# Patient Record
Sex: MALE | Race: WHITE | NOT HISPANIC OR LATINO | Employment: OTHER | URBAN - METROPOLITAN AREA
[De-identification: names, ages, dates, MRNs, and addresses within clinical notes are randomized per-mention and may not be internally consistent; named-entity substitution may affect disease eponyms.]

---

## 2017-01-19 ENCOUNTER — CARE COORDINATION (OUTPATIENT)
Dept: UROLOGY | Facility: CLINIC | Age: 82
End: 2017-01-19

## 2017-01-19 NOTE — PROGRESS NOTES
Left 3 messages to see if pt had his H &P done    Eleni Prescott, RN   Care Coordinator Urology

## 2017-01-24 ENCOUNTER — ANESTHESIA (OUTPATIENT)
Dept: SURGERY | Facility: AMBULATORY SURGERY CENTER | Age: 82
End: 2017-01-24

## 2017-01-24 RX ORDER — GLYCOPYRROLATE 0.2 MG/ML
INJECTION, SOLUTION INTRAMUSCULAR; INTRAVENOUS PRN
Status: DISCONTINUED | OUTPATIENT
Start: 2017-01-24 | End: 2017-01-24

## 2017-01-24 RX ORDER — PROPOFOL 10 MG/ML
INJECTION, EMULSION INTRAVENOUS CONTINUOUS PRN
Status: DISCONTINUED | OUTPATIENT
Start: 2017-01-24 | End: 2017-01-24

## 2017-01-24 RX ORDER — PROPOFOL 10 MG/ML
INJECTION, EMULSION INTRAVENOUS PRN
Status: DISCONTINUED | OUTPATIENT
Start: 2017-01-24 | End: 2017-01-24

## 2017-01-24 RX ORDER — LIDOCAINE HYDROCHLORIDE 20 MG/ML
INJECTION, SOLUTION INFILTRATION; PERINEURAL PRN
Status: DISCONTINUED | OUTPATIENT
Start: 2017-01-24 | End: 2017-01-24

## 2017-01-24 RX ORDER — EPHEDRINE SULFATE 50 MG/ML
INJECTION, SOLUTION INTRAMUSCULAR; INTRAVENOUS; SUBCUTANEOUS PRN
Status: DISCONTINUED | OUTPATIENT
Start: 2017-01-24 | End: 2017-01-24

## 2017-01-24 RX ORDER — DEXAMETHASONE SODIUM PHOSPHATE 4 MG/ML
INJECTION, SOLUTION INTRA-ARTICULAR; INTRALESIONAL; INTRAMUSCULAR; INTRAVENOUS; SOFT TISSUE PRN
Status: DISCONTINUED | OUTPATIENT
Start: 2017-01-24 | End: 2017-01-24

## 2017-01-24 RX ORDER — ONDANSETRON 2 MG/ML
INJECTION INTRAMUSCULAR; INTRAVENOUS PRN
Status: DISCONTINUED | OUTPATIENT
Start: 2017-01-24 | End: 2017-01-24

## 2017-01-24 RX ORDER — FENTANYL CITRATE 50 UG/ML
INJECTION, SOLUTION INTRAMUSCULAR; INTRAVENOUS PRN
Status: DISCONTINUED | OUTPATIENT
Start: 2017-01-24 | End: 2017-01-24

## 2017-01-24 RX ADMIN — EPHEDRINE SULFATE 10 MG: 50 INJECTION, SOLUTION INTRAMUSCULAR; INTRAVENOUS; SUBCUTANEOUS at 08:20

## 2017-01-24 RX ADMIN — GLYCOPYRROLATE 0.2 MG: 0.2 INJECTION, SOLUTION INTRAMUSCULAR; INTRAVENOUS at 08:45

## 2017-01-24 RX ADMIN — PROPOFOL 50 MCG/KG/MIN: 10 INJECTION, EMULSION INTRAVENOUS at 11:10

## 2017-01-24 RX ADMIN — FENTANYL CITRATE 50 MCG: 50 INJECTION, SOLUTION INTRAMUSCULAR; INTRAVENOUS at 08:06

## 2017-01-24 RX ADMIN — ONDANSETRON 4 MG: 2 INJECTION INTRAMUSCULAR; INTRAVENOUS at 11:05

## 2017-01-24 RX ADMIN — PROPOFOL: 10 INJECTION, EMULSION INTRAVENOUS at 10:23

## 2017-01-24 RX ADMIN — PROPOFOL: 10 INJECTION, EMULSION INTRAVENOUS at 09:39

## 2017-01-24 RX ADMIN — LIDOCAINE HYDROCHLORIDE 100 MG: 20 INJECTION, SOLUTION INFILTRATION; PERINEURAL at 08:06

## 2017-01-24 RX ADMIN — DEXAMETHASONE SODIUM PHOSPHATE 4 MG: 4 INJECTION, SOLUTION INTRA-ARTICULAR; INTRALESIONAL; INTRAMUSCULAR; INTRAVENOUS; SOFT TISSUE at 08:15

## 2017-01-24 RX ADMIN — FENTANYL CITRATE 25 MCG: 50 INJECTION, SOLUTION INTRAMUSCULAR; INTRAVENOUS at 09:35

## 2017-01-24 RX ADMIN — PROPOFOL 150 MCG/KG/MIN: 10 INJECTION, EMULSION INTRAVENOUS at 08:09

## 2017-01-24 RX ADMIN — ONDANSETRON 4 MG: 2 INJECTION INTRAMUSCULAR; INTRAVENOUS at 08:15

## 2017-01-24 RX ADMIN — PROPOFOL 150 MG: 10 INJECTION, EMULSION INTRAVENOUS at 08:06

## 2017-01-24 RX ADMIN — FENTANYL CITRATE 25 MCG: 50 INJECTION, SOLUTION INTRAMUSCULAR; INTRAVENOUS at 10:40

## 2017-01-24 RX ADMIN — EPHEDRINE SULFATE 5 MG: 50 INJECTION, SOLUTION INTRAMUSCULAR; INTRAVENOUS; SUBCUTANEOUS at 08:53

## 2017-01-25 DIAGNOSIS — Z86.79 S/P EXCISION OF VARICOCELE: Primary | ICD-10-CM

## 2017-01-25 DIAGNOSIS — Z98.890 S/P EXCISION OF VARICOCELE: Primary | ICD-10-CM

## 2017-01-27 ENCOUNTER — CARE COORDINATION (OUTPATIENT)
Dept: UROLOGY | Facility: CLINIC | Age: 82
End: 2017-01-27

## 2017-01-27 NOTE — PROGRESS NOTES
Left message to call to let us know how he us know how he is doing since the surgery on Tuesday    Eleni Prescott, RN   Care Coordinator Urology

## 2017-02-03 ENCOUNTER — PRE VISIT (OUTPATIENT)
Dept: UROLOGY | Facility: CLINIC | Age: 82
End: 2017-02-03

## 2017-02-03 NOTE — TELEPHONE ENCOUNTER
Bilateral varicocelectomy surgical follow up for scrotal pain. Records in epic. No labs or imaging needed. Records in [facility]. No need to call patient

## 2017-02-09 ENCOUNTER — OFFICE VISIT (OUTPATIENT)
Dept: UROLOGY | Facility: CLINIC | Age: 82
End: 2017-02-09

## 2017-02-09 VITALS
HEART RATE: 57 BPM | SYSTOLIC BLOOD PRESSURE: 171 MMHG | BODY MASS INDEX: 27.91 KG/M2 | DIASTOLIC BLOOD PRESSURE: 88 MMHG | WEIGHT: 200 LBS

## 2017-02-09 DIAGNOSIS — C61 PROSTATE CANCER (H): Primary | ICD-10-CM

## 2017-02-09 DIAGNOSIS — I86.1 LEFT VARICOCELE: ICD-10-CM

## 2017-02-09 ASSESSMENT — PAIN SCALES - GENERAL: PAINLEVEL: NO PAIN (0)

## 2017-02-09 NOTE — NURSING NOTE
Chief Complaint   Patient presents with     Surgical Followup     Bilateral varicocelectomy      Endy SHAH

## 2017-02-09 NOTE — MR AVS SNAPSHOT
After Visit Summary   2/9/2017    Panchito Olivera    MRN: [id number]           Patient Information     Date Of Birth          1935        Visit Information        Provider Department      2/9/2017 2:00 PM Diego Randle MD [facility] and [facility] for Prostate and Urologic Cancers        Today's Diagnoses     Prostate cancer (H)    -  1     Left varicocele           Care Instructions    Follow up with Dr Sunshine concerning your PSA on 4/13/17 at 3 pm. Please complete PSA prior to visit    It was a pleasure meeting with you today.  Thank you for allowing me and my team the privilege of caring for you today.  YOU are the reason we are here, and I truly hope we provided you with the excellent service you deserve.  Please let us know if there is anything else we can do for you so that we can be sure you are leaving completely satisfied with your care experience.                Follow-ups after your visit        Additional Services     IR REFERRAL       [facility] IR REFERRAL  Call [phone] to schedule.    IR General Referral    Procedure requested: consult about left varicocele embolization, possible trans-scrotal venography to look for persistent left varicocele after microscopic varicocele repair.  Associated Diagnosis: left varicocele   Date preferred: April 2017.       The Interventional Radiology 1C will consult patients for these procedures:  Lung biopsy, kidney biopsy, liver biopsy, line placement, port placement.  If the procedure requested is not in the list above, please place this referral and call [phone].    Please be aware that coverage of these services is subject to the terms and limitations of your health insurance plan.  Call member services at your health plan with any benefit or coverage questions.       Please bring the following to your appointment:  >>   Any x-rays, CTs or MRIs which have been performed.  Contact the facility where they were done to arrange for pick  up prior to your scheduled appointment.  Any new CT, MRI or other procedures ordered by your specialist must be performed at a [facility] facility or coordinated by your clinic's referral office.    >>   List of current medications  >>   This referral request   >>   Any documents/labs given to you for this referral                  Your next 10 appointments already scheduled     Apr 13, 2017  3:00 PM   (Arrive by 2:45 PM)   Return Prostate Cancer with Endy Sunshine MD   [facility] and [facility] for Prostate and Urologic Cancers ([facility])    [address] [place] [address]  [address] [facility] [address] [phone]              Future tests that were ordered for you today     Open Future Orders        Priority Expected Expires Ordered    PSA tumor marker Routine  5/9/2017 2/9/2017    Testosterone total Routine  5/9/2017 2/9/2017            Who to contact     Please call your clinic at [phone] to:    Ask questions about your health    Make or cancel appointments    Discuss your medicines    Learn about your test results    Speak to your doctor   If you have compliments or concerns about an experience at your clinic, or if you wish to file a complaint, please contact [facility] Patient Relations at [phone] or email us at [email or web address]         Additional Information About Your Visit        [email or web address] Information     App[employer]ially is an electronic gateway that provides easy, online access to your medical records. With App[employer]ially, you can request a clinic appointment, read your test results, renew a prescription or communicate with your care team.     To sign up for [employer] visit the website at [email or web address]   You will be asked to enter the access code listed below, as well as some personal information. Please follow the directions to create your username and password.     Your access code is: [email or web address]  Expires: 4/24/2017  12:23 PM     Your access code will  in 90 days. If you need help or a new code, please contact your [facility] or call [phone] for assistance.        Care EveryWhere ID     This is your Care EveryWhere ID. This could be used by other organizations to access your [facility] medical records  [id number]        Your Vitals Were     Pulse                   57            Blood Pressure from Last 3 Encounters:   17 171/88   17 142/90   16 178/99    Weight from Last 3 Encounters:   17 90.719 kg (200 lb)   17 90.719 kg (200 lb)   16 92.08 kg (203 lb)              We Performed the Following     IR REFERRAL        Primary Care Provider Office Phone # Fax #    Santiago Knight [phone]       XXX RESIGNED XXX [address]  [facility] [address]        Thank you!     Thank you for choosing [facility] UROLOGY AND [facility] FOR PROSTATE AND UROLOGIC CANCERS  for your care. Our goal is always to provide you with excellent care. Hearing back from our patients is one way we can continue to improve our services. Please take a few minutes to complete the written survey that you may receive in the mail after your visit with us. Thank you!             Your Updated Medication List - Protect others around you: Learn how to safely use, store and throw away your medicines at www.disposemymeds.org.          This list is accurate as of: 17  2:36 PM.  Always use your most recent med list.                   Brand Name Dispense Instructions for use    aspirin 81 MG tablet     100 tablet    Take 1 tablet by mouth daily.       * blood glucose monitoring test strip    no brand specified    3 Box    by In Vitro route daily.       * blood glucose monitoring test strip    no brand specified    3 Box    Check blood sugar twice daily       CARVEDILOL PO      Take 12.5 mg by mouth 2 times daily (with meals)       Coenzyme Q10 300 MG Caps      Take 300 mg by mouth  every morning       hydrochlorothiazide 12.5 MG capsule    MICROZIDE     Take 12.5 mg by mouth every morning       insulin aspart protamine-insulin aspart injection    NovoLOG MIX 70/30 FLEXpen    3 Month    Inject 5 -7 units under the skin twice daily before meals.       insulin pen needle 29G X 12.7MM    BD ULTRA-FINE    100 each    Use 2 daily or as directed.       levothyroxine 150 MCG tablet    LEVOXYL    90 tablet    Take 1 tablet by mouth daily.       LOSARTAN POTASSIUM PO      Take 100 mg by mouth daily       nitroglycerin 0.4 MG sublingual tablet    NITROSTAT    25 tablet    Place 1 tablet under the tongue every 5 minutes as needed for chest pain.       oxyCODONE-acetaminophen 5-325 MG per tablet    PERCOCET    25 tablet    Take 1 tablet by mouth every 4 hours as needed for pain       tadalafil 20 MG tablet    CIALIS    30 tablet    Take 1 tablet by mouth. TAKEN AT LEAST 30 MINUTES BEFORE INTERCOURSE       vitamin  B-12 250 MCG Tabs      Take 250 mcg by mouth every morning       * Notice:  This list has 2 medication(s) that are the same as other medications prescribed for you. Read the directions carefully, and ask your doctor or other care provider to review them with you.

## 2017-02-09 NOTE — LETTER
2/9/2017       RE: Panchito Olivera  [address] [place] [address]     Dear Colleague,    Thank you for referring your patient, Panchito Olivera, to the [facility] UROLOGY AND [facility] FOR PROSTATE AND UROLOGIC CANCERS at [facility]. Please see a copy of my visit note below.    CC: Panchito Olivera is post-op from bilateral varicocele repair   HPI: Patient is 2 wks post op.  he has been doing well. No fevers or chills. Pain decreasing.   He states the left scrotal pain was gone, now notes a little twinge of pain on the left side again.  Scrotal ultrasound done today suggests a persistent left varicocele.    Exam today /88 mmHg  Pulse 57  Wt 90.719 kg (200 lb)  Alert, oriented, NAD.  Incisions are healing well. No discharge, erythema or fluctuence suggestive of infection.   No clinical varicocele by my exam today.  No engorgement with valsalva.    PLAN: future T and FSH levels.  He wants to see IR to get another opinion to see if left varicocele needs embolization, also to help with the left scrotal pain.     I told him varicocele repair should not have improved his prostate cancer.    I did advise he also follow-up with one of my partners that manages prostate cancer.  He would like to do that now.      Cal RECIO     Visit within post-op global.

## 2017-02-12 NOTE — PROGRESS NOTES
CC: Panchito Olivera is post-op from bilateral varicocele repair   HPI: Patient is 2 wks post op.  he has been doing well. No fevers or chills. Pain decreasing.   He states the left scrotal pain was gone, now notes a little twinge of pain on the left side again.  Scrotal ultrasound done today suggests a persistent left varicocele.    Exam today /88 mmHg  Pulse 57  Wt 90.719 kg (200 lb)  Alert, oriented, NAD.  Incisions are healing well. No discharge, erythema or fluctuence suggestive of infection.   No clinical varicocele by my exam today.  No engorgement with valsalva.    PLAN: future T and FSH levels.  He wants to see IR to get another opinion to see if left varicocele needs embolization, also to help with the left scrotal pain.     I told him varicocele repair should not have improved his prostate cancer.    I did advise he also follow-up with one of my partners that manages prostate cancer.  He would like to do that now.      Cal RECIO     Visit within post-op global.

## 2017-04-26 ENCOUNTER — TELEPHONE (OUTPATIENT)
Dept: UROLOGY | Facility: CLINIC | Age: 82
End: 2017-04-26

## 2017-04-26 NOTE — TELEPHONE ENCOUNTER
[facility] Call Center    Phone Message    Name of Caller: Panchito    Phone Number: Cell number on file:    Telephone Information:   Mobile [phone]       Best time to return call: any    May a detailed message be left on voicemail: yes    Relation to patient: Self    Reason for Call: Other: patient is calling to follow up on Dr. Randle reccomendations for seeing IR and a prostate cancer doctor. Please advise.     Action Taken: Message routed to:  Adult Clinics: Urology p [phone]

## 2017-04-26 NOTE — TELEPHONE ENCOUNTER
Called pt.    Discussed his varicocele situation.    On last exam, I did not think he had a clinical varicocele.  He is status-post bilateral varicocele repair Jan 2017.    His last scrotal ultrasound suggests dilated veins still present and maybe reversal of flow with valsalva.    He would like to see IR to discuss if embolization is appropriate.  He has the number and will call, declines offer for my office to call for him.    He understands PSA is rising and will seek follow-up for that with prostate cancer expert.    Cal RECIO

## 2017-04-26 NOTE — TELEPHONE ENCOUNTER
"Discussed patient question with Dr. Randle who recommends for patient to schedule to see Dr. Sunshine or Dr. Cordero at the [facility].     Called and spoke to patient who stated, \"I was supposed to get a call from another department to check on things further but I never did.\" Informed patient of the IR telephone number of [phone] and patient will call to schedule consult with IR. Patient aware of recommendation to schedule to see Dr. Sunshine or Dr. Cordero who specialize in prostate cancer care. [facility] telephone number provided to patient.    Patient stated, \"I am frustrated because before my surgery the ultrasound showed one thing and the ultrasound after surgery showed something different. I just don't understand why I can't talk to Dr. Randle. I paid money to see him and have him care for me and now I just feel like I am being pawned off. Why do I need to see another doctor and pay another $250 to see them.\" Reminded patient that it was recommended for him to see Dr. Susnhine or Dr. Cordero as they specialize in prostate cancer. Patient stated, \"I would really appreciate if I could just talk to Dr. Randle.\" Informed patient that a message would be sent to Dr. Randle with request for him to contact patient. Patient verbalized understanding and was comfortable with plan.     Nancy Song  General Surgery - Urology RN        "

## 2017-07-08 ENCOUNTER — HEALTH MAINTENANCE LETTER (OUTPATIENT)
Age: 82
End: 2017-07-08

## 2018-05-08 ENCOUNTER — ANESTHESIA - HEALTHEAST (OUTPATIENT)
Dept: SURGERY | Facility: HOSPITAL | Age: 83
End: 2018-05-08

## 2018-05-08 ASSESSMENT — MIFFLIN-ST. JEOR: SCORE: 1587.57

## 2018-05-09 ENCOUNTER — SURGERY - HEALTHEAST (OUTPATIENT)
Dept: SURGERY | Facility: HOSPITAL | Age: 83
End: 2018-05-09

## 2019-05-14 NOTE — PATIENT INSTRUCTIONS
Follow up with Dr Sunshine concerning your PSA on 4/13/17 at 3 pm. Please complete PSA prior to visit    It was a pleasure meeting with you today.  Thank you for allowing me and my team the privilege of caring for you today.  YOU are the reason we are here, and I truly hope we provided you with the excellent service you deserve.  Please let us know if there is anything else we can do for you so that we can be sure you are leaving completely satisfied with your care experience.           take motrin 600 mg every 6 hours as needed

## 2021-06-01 VITALS — HEIGHT: 69 IN | BODY MASS INDEX: 29.62 KG/M2 | WEIGHT: 200 LBS

## 2021-06-17 NOTE — ANESTHESIA PREPROCEDURE EVALUATION
Anesthesia Evaluation      Patient summary reviewed   No history of anesthetic complications     Airway   Mallampati: II  Neck ROM: full   Pulmonary - negative ROS and normal exam                          Cardiovascular - normal exam  (+) hypertension, ,     ECG reviewed        Neuro/Psych - negative ROS     Endo/Other    (+) diabetes mellitus type 2 well controlled, hypothyroidism, arthritis,      GI/Hepatic/Renal    (+)   chronic renal disease (CKD Stage III) CRI,           Dental - normal exam                        Anesthesia Plan  Planned anesthetic: general endotracheal  - D50 25mL bolus, per patient BG 75 is low for him (has symptoms at BG 85)  - Will recheck BS in OR  - ketamine 50mg  ASA 2   Induction: intravenous   Anesthetic plan and risks discussed with: patient    Post-op plan: routine recovery

## 2021-06-17 NOTE — ANESTHESIA POSTPROCEDURE EVALUATION
Patient: Panchito Olivera  3 PIECE PENILE PROSTHESIS  Anesthesia type: general    Patient location: PACU  Last vitals:   Vitals:    05/09/18 1300   BP: 167/77   Pulse: 61   Resp:    Temp:    SpO2: 95%     Post vital signs: stable  Level of consciousness: awake and responds to simple questions  Post-anesthesia pain: pain controlled  Post-anesthesia nausea and vomiting: no  Pulmonary: unassisted  Cardiovascular: stable  Hydration: adequate  Anesthetic events: no    QCDR Measures:  ASA# 11 - Tea-op Cardiac Arrest: ASA11B - Patient did NOT experience unanticipated cardiac arrest  ASA# 12 - Tea-op Mortality Rate: ASA12B - Patient did NOT die  ASA# 13 - PACU Re-Intubation Rate: ASA13B - Patient did NOT require a new airway mgmt  ASA# 10 - Composite Anes Safety: ASA10A - No serious adverse event    Additional Notes:

## 2021-06-17 NOTE — ANESTHESIA CARE TRANSFER NOTE
Last vitals:   Vitals:    05/09/18 0950   BP: (!) 195/94   Pulse: 94   Resp: 12   Temp: 36.8  C (98.3  F)   SpO2: 100%     Patient's level of consciousness is drowsy  Spontaneous respirations: yes  Maintains airway independently: yes  Dentition unchanged: yes  Oropharynx: oropharynx clear of all foreign objects    QCDR Measures:  ASA# 20 - Surgical Safety Checklist: WHO surgical safety checklist completed prior to induction  PQRS# 430 - Adult PONV Prevention: 4558F - Pt received => 2 anti-emetic agents (different classes) preop & intraop  ASA# 8 - Peds PONV Prevention: NA - Not pediatric patient, not GA or 2 or more risk factors NOT present  PQRS# 424 - Tea-op Temp Management: 4559F - At least one body temp DOCUMENTED => 35.5C or 95.9F within required timeframe  PQRS# 426 - PACU Transfer Protocol: - Transfer of care checklist used  ASA# 14 - Acute Post-op Pain: ASA14B - Patient did NOT experience pain >= 7 out of 10

## 2021-09-24 ENCOUNTER — HOSPITAL ENCOUNTER (OUTPATIENT)
Dept: CT IMAGING | Facility: CLINIC | Age: 86
Discharge: HOME | End: 2021-09-24
Attending: INTERNAL MEDICINE | Admitting: INTERNAL MEDICINE
Payer: MEDICARE

## 2021-09-24 DIAGNOSIS — C61 PROSTATE CANCER (H): ICD-10-CM

## 2021-09-24 LAB
CREAT BLD-MCNC: 1.7 MG/DL (ref 0.7–1.3)
GFR SERPL CREATININE-BSD FRML MDRD: 36 ML/MIN/1.73M2

## 2021-09-24 PROCEDURE — 250N000009 HC RX 250: Performed by: RADIOLOGY

## 2021-09-24 PROCEDURE — 74177 CT ABD & PELVIS W/CONTRAST: CPT

## 2021-09-24 PROCEDURE — 82565 ASSAY OF CREATININE: CPT

## 2021-09-24 PROCEDURE — 250N000011 HC RX IP 250 OP 636: Performed by: RADIOLOGY

## 2021-09-24 RX ORDER — IOPAMIDOL 755 MG/ML
98 INJECTION, SOLUTION INTRAVASCULAR ONCE
Status: COMPLETED | OUTPATIENT
Start: 2021-09-24 | End: 2021-09-24

## 2021-09-24 RX ADMIN — IOPAMIDOL 80 ML: 755 INJECTION, SOLUTION INTRAVENOUS at 12:34

## 2021-09-24 RX ADMIN — SODIUM CHLORIDE 68 ML: 9 INJECTION, SOLUTION INTRAVENOUS at 12:34

## 2021-09-27 ENCOUNTER — HOSPITAL ENCOUNTER (OUTPATIENT)
Dept: NUCLEAR MEDICINE | Facility: CLINIC | Age: 86
Setting detail: NUCLEAR MEDICINE
End: 2021-09-27
Attending: INTERNAL MEDICINE
Payer: MEDICARE

## 2021-09-27 DIAGNOSIS — C61 PROSTATE CANCER (H): ICD-10-CM

## 2021-09-27 PROCEDURE — A9503 TC99M MEDRONATE: HCPCS | Performed by: RADIOLOGY

## 2021-09-27 PROCEDURE — 343N000001 HC RX 343: Performed by: RADIOLOGY

## 2021-09-27 PROCEDURE — 78306 BONE IMAGING WHOLE BODY: CPT

## 2021-09-27 RX ORDER — TC 99M MEDRONATE 20 MG/10ML
25 INJECTION, POWDER, LYOPHILIZED, FOR SOLUTION INTRAVENOUS ONCE
Status: COMPLETED | OUTPATIENT
Start: 2021-09-27 | End: 2021-09-27

## 2021-09-27 RX ADMIN — TC 99M MEDRONATE 26 MCI.: 20 INJECTION, POWDER, LYOPHILIZED, FOR SOLUTION INTRAVENOUS at 10:58

## 2023-10-13 ENCOUNTER — HOSPITAL ENCOUNTER (EMERGENCY)
Facility: CLINIC | Age: 88
Discharge: LEFT BEFORE DISCHARGE | End: 2023-10-13
Admitting: EMERGENCY MEDICINE
Payer: MEDICARE

## 2023-10-13 VITALS
HEART RATE: 63 BPM | WEIGHT: 197 LBS | SYSTOLIC BLOOD PRESSURE: 153 MMHG | OXYGEN SATURATION: 94 % | RESPIRATION RATE: 16 BRPM | BODY MASS INDEX: 29.09 KG/M2 | TEMPERATURE: 97.4 F | DIASTOLIC BLOOD PRESSURE: 84 MMHG

## 2023-10-13 PROCEDURE — 99281 EMR DPT VST MAYX REQ PHY/QHP: CPT

## 2023-10-13 NOTE — ED TRIAGE NOTES
Patient presents with foreign object in left heel. States he thinks he stepped on a piece of glass or porcelain 1 week ago. Skin intact around area. No redness, swelling or drainage noted. Area hardened.

## 2023-11-23 ENCOUNTER — APPOINTMENT (OUTPATIENT)
Dept: CT IMAGING | Facility: CLINIC | Age: 88
DRG: 322 | End: 2023-11-23
Attending: EMERGENCY MEDICINE
Payer: MEDICARE

## 2023-11-23 ENCOUNTER — HOSPITAL ENCOUNTER (INPATIENT)
Facility: CLINIC | Age: 88
LOS: 2 days | Discharge: HOME | DRG: 322 | End: 2023-11-25
Attending: EMERGENCY MEDICINE | Admitting: INTERNAL MEDICINE
Payer: MEDICARE

## 2023-11-23 ENCOUNTER — APPOINTMENT (OUTPATIENT)
Dept: CT IMAGING | Facility: CLINIC | Age: 88
DRG: 322 | End: 2023-11-23
Attending: INTERNAL MEDICINE
Payer: MEDICARE

## 2023-11-23 DIAGNOSIS — Z91.148 NON COMPLIANCE W MEDICATION REGIMEN: ICD-10-CM

## 2023-11-23 DIAGNOSIS — N13.30 BILATERAL HYDRONEPHROSIS: ICD-10-CM

## 2023-11-23 DIAGNOSIS — I25.10 CORONARY ARTERY DISEASE INVOLVING NATIVE CORONARY ARTERY OF NATIVE HEART WITHOUT ANGINA PECTORIS: ICD-10-CM

## 2023-11-23 DIAGNOSIS — I16.0 HYPERTENSIVE URGENCY: ICD-10-CM

## 2023-11-23 DIAGNOSIS — M62.838 MUSCLE SPASM: ICD-10-CM

## 2023-11-23 DIAGNOSIS — R07.9 CHEST PAIN, UNSPECIFIED TYPE: ICD-10-CM

## 2023-11-23 DIAGNOSIS — I21.4 NSTEMI (NON-ST ELEVATED MYOCARDIAL INFARCTION) (H): ICD-10-CM

## 2023-11-23 DIAGNOSIS — N39.0 URINARY TRACT INFECTION WITHOUT HEMATURIA, SITE UNSPECIFIED: ICD-10-CM

## 2023-11-23 DIAGNOSIS — I20.0 UNSTABLE ANGINA (H): Primary | ICD-10-CM

## 2023-11-23 LAB
ALBUMIN UR-MCNC: NEGATIVE MG/DL
ANION GAP SERPL CALCULATED.3IONS-SCNC: 9 MMOL/L (ref 7–15)
APPEARANCE UR: CLEAR
BASOPHILS # BLD AUTO: 0 10E3/UL (ref 0–0.2)
BASOPHILS NFR BLD AUTO: 0 %
BILIRUB UR QL STRIP: NEGATIVE
BUN SERPL-MCNC: 23.8 MG/DL (ref 8–23)
CALCIUM SERPL-MCNC: 9.6 MG/DL (ref 8.8–10.2)
CHLORIDE SERPL-SCNC: 105 MMOL/L (ref 98–107)
COLOR UR AUTO: NORMAL
CREAT SERPL-MCNC: 1.54 MG/DL (ref 0.67–1.17)
D DIMER PPP FEU-MCNC: 0.93 UG/ML FEU (ref 0–0.5)
DEPRECATED HCO3 PLAS-SCNC: 24 MMOL/L (ref 22–29)
EGFRCR SERPLBLD CKD-EPI 2021: 43 ML/MIN/1.73M2
EOSINOPHIL # BLD AUTO: 0.2 10E3/UL (ref 0–0.7)
EOSINOPHIL NFR BLD AUTO: 3 %
ERYTHROCYTE [DISTWIDTH] IN BLOOD BY AUTOMATED COUNT: 13.2 % (ref 10–15)
GLUCOSE SERPL-MCNC: 141 MG/DL (ref 70–99)
GLUCOSE UR STRIP-MCNC: NEGATIVE MG/DL
HBA1C MFR BLD: 6.7 %
HCT VFR BLD AUTO: 43.8 % (ref 40–53)
HGB BLD-MCNC: 14.4 G/DL (ref 13.3–17.7)
HGB UR QL STRIP: NEGATIVE
IMM GRANULOCYTES # BLD: 0 10E3/UL
IMM GRANULOCYTES NFR BLD: 0 %
KETONES UR STRIP-MCNC: NEGATIVE MG/DL
LEUKOCYTE ESTERASE UR QL STRIP: NEGATIVE
LYMPHOCYTES # BLD AUTO: 1.5 10E3/UL (ref 0.8–5.3)
LYMPHOCYTES NFR BLD AUTO: 20 %
MAGNESIUM SERPL-MCNC: 2.2 MG/DL (ref 1.7–2.3)
MCH RBC QN AUTO: 28.7 PG (ref 26.5–33)
MCHC RBC AUTO-ENTMCNC: 32.9 G/DL (ref 31.5–36.5)
MCV RBC AUTO: 87 FL (ref 78–100)
MONOCYTES # BLD AUTO: 1.3 10E3/UL (ref 0–1.3)
MONOCYTES NFR BLD AUTO: 18 %
NEUTROPHILS # BLD AUTO: 4.2 10E3/UL (ref 1.6–8.3)
NEUTROPHILS NFR BLD AUTO: 59 %
NITRATE UR QL: NEGATIVE
NRBC # BLD AUTO: 0 10E3/UL
NRBC BLD AUTO-RTO: 0 /100
PH UR STRIP: 6.5 [PH] (ref 5–7)
PLATELET # BLD AUTO: 116 10E3/UL (ref 150–450)
POTASSIUM SERPL-SCNC: 3.8 MMOL/L (ref 3.4–5.3)
RBC # BLD AUTO: 5.02 10E6/UL (ref 4.4–5.9)
SODIUM SERPL-SCNC: 138 MMOL/L (ref 135–145)
SP GR UR STRIP: 1.01 (ref 1–1.03)
TROPONIN T SERPL HS-MCNC: 42 NG/L
TROPONIN T SERPL HS-MCNC: 44 NG/L
TROPONIN T SERPL HS-MCNC: 49 NG/L
TSH SERPL DL<=0.005 MIU/L-ACNC: 2.29 UIU/ML (ref 0.3–4.2)
UFH PPP CHRO-ACNC: 0.55 IU/ML
UROBILINOGEN UR STRIP-MCNC: NORMAL MG/DL
WBC # BLD AUTO: 7.2 10E3/UL (ref 4–11)

## 2023-11-23 PROCEDURE — 36415 COLL VENOUS BLD VENIPUNCTURE: CPT | Performed by: EMERGENCY MEDICINE

## 2023-11-23 PROCEDURE — 74176 CT ABD & PELVIS W/O CONTRAST: CPT

## 2023-11-23 PROCEDURE — 99222 1ST HOSP IP/OBS MODERATE 55: CPT | Performed by: INTERNAL MEDICINE

## 2023-11-23 PROCEDURE — 85520 HEPARIN ASSAY: CPT | Performed by: INTERNAL MEDICINE

## 2023-11-23 PROCEDURE — 250N000009 HC RX 250: Performed by: EMERGENCY MEDICINE

## 2023-11-23 PROCEDURE — 84484 ASSAY OF TROPONIN QUANT: CPT | Performed by: INTERNAL MEDICINE

## 2023-11-23 PROCEDURE — 81003 URINALYSIS AUTO W/O SCOPE: CPT | Performed by: INTERNAL MEDICINE

## 2023-11-23 PROCEDURE — 250N000013 HC RX MED GY IP 250 OP 250 PS 637: Performed by: INTERNAL MEDICINE

## 2023-11-23 PROCEDURE — 84484 ASSAY OF TROPONIN QUANT: CPT | Performed by: EMERGENCY MEDICINE

## 2023-11-23 PROCEDURE — 99223 1ST HOSP IP/OBS HIGH 75: CPT | Mod: AI | Performed by: INTERNAL MEDICINE

## 2023-11-23 PROCEDURE — 210N000002 HC R&B HEART CARE

## 2023-11-23 PROCEDURE — 85025 COMPLETE CBC W/AUTO DIFF WBC: CPT | Performed by: EMERGENCY MEDICINE

## 2023-11-23 PROCEDURE — 250N000011 HC RX IP 250 OP 636: Performed by: EMERGENCY MEDICINE

## 2023-11-23 PROCEDURE — 85379 FIBRIN DEGRADATION QUANT: CPT | Performed by: EMERGENCY MEDICINE

## 2023-11-23 PROCEDURE — 84443 ASSAY THYROID STIM HORMONE: CPT | Performed by: EMERGENCY MEDICINE

## 2023-11-23 PROCEDURE — 71275 CT ANGIOGRAPHY CHEST: CPT

## 2023-11-23 PROCEDURE — 93005 ELECTROCARDIOGRAM TRACING: CPT

## 2023-11-23 PROCEDURE — 83036 HEMOGLOBIN GLYCOSYLATED A1C: CPT | Performed by: INTERNAL MEDICINE

## 2023-11-23 PROCEDURE — 83735 ASSAY OF MAGNESIUM: CPT | Performed by: EMERGENCY MEDICINE

## 2023-11-23 PROCEDURE — 80048 BASIC METABOLIC PNL TOTAL CA: CPT | Performed by: EMERGENCY MEDICINE

## 2023-11-23 PROCEDURE — 250N000011 HC RX IP 250 OP 636: Mod: JZ | Performed by: EMERGENCY MEDICINE

## 2023-11-23 PROCEDURE — 250N000013 HC RX MED GY IP 250 OP 250 PS 637: Performed by: EMERGENCY MEDICINE

## 2023-11-23 PROCEDURE — 36415 COLL VENOUS BLD VENIPUNCTURE: CPT | Performed by: INTERNAL MEDICINE

## 2023-11-23 PROCEDURE — 99285 EMERGENCY DEPT VISIT HI MDM: CPT | Mod: 25

## 2023-11-23 RX ORDER — HEPARIN SODIUM 10000 [USP'U]/100ML
0-5000 INJECTION, SOLUTION INTRAVENOUS CONTINUOUS
Status: DISCONTINUED | OUTPATIENT
Start: 2023-11-23 | End: 2023-11-23

## 2023-11-23 RX ORDER — MAGNESIUM HYDROXIDE/ALUMINUM HYDROXICE/SIMETHICONE 120; 1200; 1200 MG/30ML; MG/30ML; MG/30ML
30 SUSPENSION ORAL EVERY 4 HOURS PRN
Status: DISCONTINUED | OUTPATIENT
Start: 2023-11-23 | End: 2023-11-25 | Stop reason: HOSPADM

## 2023-11-23 RX ORDER — CYANOCOBALAMIN (VITAMIN B-12) 500 MCG
250 TABLET ORAL EVERY MORNING
Status: DISCONTINUED | OUTPATIENT
Start: 2023-11-24 | End: 2023-11-25 | Stop reason: HOSPADM

## 2023-11-23 RX ORDER — CEPHALEXIN 500 MG/1
500 CAPSULE ORAL 3 TIMES DAILY
Status: ON HOLD | COMMUNITY
Start: 2023-11-22 | End: 2023-11-25

## 2023-11-23 RX ORDER — AMOXICILLIN 250 MG
1 CAPSULE ORAL 2 TIMES DAILY PRN
Status: DISCONTINUED | OUTPATIENT
Start: 2023-11-23 | End: 2023-11-25 | Stop reason: HOSPADM

## 2023-11-23 RX ORDER — ONDANSETRON 2 MG/ML
4 INJECTION INTRAMUSCULAR; INTRAVENOUS EVERY 6 HOURS PRN
Status: DISCONTINUED | OUTPATIENT
Start: 2023-11-23 | End: 2023-11-25 | Stop reason: HOSPADM

## 2023-11-23 RX ORDER — ACETAMINOPHEN 325 MG/1
650 TABLET ORAL EVERY 4 HOURS PRN
Status: DISCONTINUED | OUTPATIENT
Start: 2023-11-23 | End: 2023-11-25 | Stop reason: HOSPADM

## 2023-11-23 RX ORDER — ASPIRIN 81 MG/1
81 TABLET ORAL DAILY
Status: DISCONTINUED | OUTPATIENT
Start: 2023-11-24 | End: 2023-11-24

## 2023-11-23 RX ORDER — LEVOTHYROXINE SODIUM 175 UG/1
175 TABLET ORAL DAILY
COMMUNITY

## 2023-11-23 RX ORDER — AMLODIPINE BESYLATE 5 MG/1
5 TABLET ORAL DAILY
Status: DISCONTINUED | OUTPATIENT
Start: 2023-11-23 | End: 2023-11-24

## 2023-11-23 RX ORDER — DEXTROSE MONOHYDRATE 25 G/50ML
25-50 INJECTION, SOLUTION INTRAVENOUS
Status: DISCONTINUED | OUTPATIENT
Start: 2023-11-23 | End: 2023-11-25 | Stop reason: HOSPADM

## 2023-11-23 RX ORDER — CLOPIDOGREL 300 MG/1
300 TABLET, FILM COATED ORAL ONCE
Status: COMPLETED | OUTPATIENT
Start: 2023-11-23 | End: 2023-11-23

## 2023-11-23 RX ORDER — MULTIVITAMIN WITH IRON
1 TABLET ORAL DAILY
COMMUNITY

## 2023-11-23 RX ORDER — LIDOCAINE 40 MG/G
CREAM TOPICAL
Status: DISCONTINUED | OUTPATIENT
Start: 2023-11-23 | End: 2023-11-24

## 2023-11-23 RX ORDER — CEFTRIAXONE 1 G/1
1 INJECTION, POWDER, FOR SOLUTION INTRAMUSCULAR; INTRAVENOUS EVERY 24 HOURS
Status: DISCONTINUED | OUTPATIENT
Start: 2023-11-23 | End: 2023-11-24

## 2023-11-23 RX ORDER — CARVEDILOL 12.5 MG/1
12.5 TABLET ORAL ONCE
Status: COMPLETED | OUTPATIENT
Start: 2023-11-23 | End: 2023-11-23

## 2023-11-23 RX ORDER — LABETALOL HYDROCHLORIDE 5 MG/ML
10 INJECTION, SOLUTION INTRAVENOUS EVERY 6 HOURS PRN
Status: DISCONTINUED | OUTPATIENT
Start: 2023-11-23 | End: 2023-11-25 | Stop reason: HOSPADM

## 2023-11-23 RX ORDER — PROCHLORPERAZINE 25 MG
12.5 SUPPOSITORY, RECTAL RECTAL EVERY 12 HOURS PRN
Status: DISCONTINUED | OUTPATIENT
Start: 2023-11-23 | End: 2023-11-25 | Stop reason: HOSPADM

## 2023-11-23 RX ORDER — NICOTINE POLACRILEX 4 MG
15-30 LOZENGE BUCCAL
Status: DISCONTINUED | OUTPATIENT
Start: 2023-11-23 | End: 2023-11-25 | Stop reason: HOSPADM

## 2023-11-23 RX ORDER — BICALUTAMIDE 50 MG/1
50 TABLET, FILM COATED ORAL DAILY
Status: DISCONTINUED | OUTPATIENT
Start: 2023-11-24 | End: 2023-11-25 | Stop reason: HOSPADM

## 2023-11-23 RX ORDER — ONDANSETRON 4 MG/1
4 TABLET, ORALLY DISINTEGRATING ORAL EVERY 6 HOURS PRN
Status: DISCONTINUED | OUTPATIENT
Start: 2023-11-23 | End: 2023-11-25 | Stop reason: HOSPADM

## 2023-11-23 RX ORDER — CARVEDILOL 12.5 MG/1
12.5 TABLET ORAL 2 TIMES DAILY WITH MEALS
Status: DISCONTINUED | OUTPATIENT
Start: 2023-11-23 | End: 2023-11-25 | Stop reason: HOSPADM

## 2023-11-23 RX ORDER — NITROGLYCERIN 0.4 MG/1
0.4 TABLET SUBLINGUAL EVERY 5 MIN PRN
Status: DISCONTINUED | OUTPATIENT
Start: 2023-11-23 | End: 2023-11-24

## 2023-11-23 RX ORDER — PROCHLORPERAZINE MALEATE 5 MG
5 TABLET ORAL EVERY 6 HOURS PRN
Status: DISCONTINUED | OUTPATIENT
Start: 2023-11-23 | End: 2023-11-25 | Stop reason: HOSPADM

## 2023-11-23 RX ORDER — HYDRALAZINE HYDROCHLORIDE 20 MG/ML
5 INJECTION INTRAMUSCULAR; INTRAVENOUS ONCE
Status: COMPLETED | OUTPATIENT
Start: 2023-11-23 | End: 2023-11-23

## 2023-11-23 RX ORDER — ACETAMINOPHEN 650 MG/1
650 SUPPOSITORY RECTAL EVERY 4 HOURS PRN
Status: DISCONTINUED | OUTPATIENT
Start: 2023-11-23 | End: 2023-11-25 | Stop reason: HOSPADM

## 2023-11-23 RX ORDER — HYDRALAZINE HYDROCHLORIDE 20 MG/ML
10 INJECTION INTRAMUSCULAR; INTRAVENOUS EVERY 6 HOURS PRN
Status: DISCONTINUED | OUTPATIENT
Start: 2023-11-23 | End: 2023-11-25 | Stop reason: HOSPADM

## 2023-11-23 RX ORDER — IOPAMIDOL 755 MG/ML
71 INJECTION, SOLUTION INTRAVASCULAR ONCE
Status: COMPLETED | OUTPATIENT
Start: 2023-11-23 | End: 2023-11-23

## 2023-11-23 RX ORDER — POLYETHYLENE GLYCOL 3350 17 G/17G
17 POWDER, FOR SOLUTION ORAL 2 TIMES DAILY PRN
Status: DISCONTINUED | OUTPATIENT
Start: 2023-11-23 | End: 2023-11-25 | Stop reason: HOSPADM

## 2023-11-23 RX ORDER — AMOXICILLIN 250 MG
2 CAPSULE ORAL 2 TIMES DAILY PRN
Status: DISCONTINUED | OUTPATIENT
Start: 2023-11-23 | End: 2023-11-25 | Stop reason: HOSPADM

## 2023-11-23 RX ORDER — CEPHALEXIN 500 MG/1
500 CAPSULE ORAL 3 TIMES DAILY
Status: DISCONTINUED | OUTPATIENT
Start: 2023-11-23 | End: 2023-11-23

## 2023-11-23 RX ORDER — HEPARIN SODIUM 10000 [USP'U]/100ML
0-5000 INJECTION, SOLUTION INTRAVENOUS CONTINUOUS
Status: DISCONTINUED | OUTPATIENT
Start: 2023-11-23 | End: 2023-11-24

## 2023-11-23 RX ORDER — CALCIUM CARBONATE 500 MG/1
1000 TABLET, CHEWABLE ORAL 4 TIMES DAILY PRN
Status: DISCONTINUED | OUTPATIENT
Start: 2023-11-23 | End: 2023-11-25 | Stop reason: HOSPADM

## 2023-11-23 RX ORDER — CLOPIDOGREL BISULFATE 75 MG/1
75 TABLET ORAL DAILY
Status: DISCONTINUED | OUTPATIENT
Start: 2023-11-24 | End: 2023-11-24

## 2023-11-23 RX ORDER — ASPIRIN 325 MG
325 TABLET ORAL ONCE
Status: COMPLETED | OUTPATIENT
Start: 2023-11-23 | End: 2023-11-23

## 2023-11-23 RX ORDER — BICALUTAMIDE 50 MG/1
50 TABLET, FILM COATED ORAL DAILY
COMMUNITY

## 2023-11-23 RX ADMIN — HYDRALAZINE HYDROCHLORIDE 5 MG: 20 INJECTION INTRAMUSCULAR; INTRAVENOUS at 14:23

## 2023-11-23 RX ADMIN — CARVEDILOL 12.5 MG: 12.5 TABLET, FILM COATED ORAL at 18:14

## 2023-11-23 RX ADMIN — HEPARIN SODIUM 1050 UNITS/HR: 10000 INJECTION, SOLUTION INTRAVENOUS at 12:37

## 2023-11-23 RX ADMIN — IOPAMIDOL 71 ML: 755 INJECTION, SOLUTION INTRAVENOUS at 09:40

## 2023-11-23 RX ADMIN — CLOPIDOGREL BISULFATE 300 MG: 300 TABLET, FILM COATED ORAL at 18:14

## 2023-11-23 RX ADMIN — CARVEDILOL 12.5 MG: 12.5 TABLET, FILM COATED ORAL at 12:33

## 2023-11-23 RX ADMIN — SODIUM CHLORIDE 95 ML: 9 INJECTION, SOLUTION INTRAVENOUS at 09:45

## 2023-11-23 RX ADMIN — AMLODIPINE BESYLATE 5 MG: 5 TABLET ORAL at 18:14

## 2023-11-23 RX ADMIN — ASPIRIN 325 MG ORAL TABLET 325 MG: 325 PILL ORAL at 12:33

## 2023-11-23 ASSESSMENT — ACTIVITIES OF DAILY LIVING (ADL)
ADLS_ACUITY_SCORE: 37
ADLS_ACUITY_SCORE: 35

## 2023-11-23 NOTE — Clinical Note
Stent deployed in the left anterior descending. Max pressure = 12 praveen. Total duration = 30 seconds.

## 2023-11-23 NOTE — Clinical Note
The first balloon was inserted into the left anterior descending.Max pressure = 10 praveen. Total duration = 10 seconds.     Max pressure = 12 praveen. Total duration = 16 seconds.    Balloon reinflated a second time: Max pressure = 12 praveen. Total duration = 16 seconds.

## 2023-11-23 NOTE — ED PROVIDER NOTES
History   Chief Complaint:  Chest Pain     HPI   Panchito Olivera is a 88 year old male with history of TIA, hypertension, type 2 diabetes, hyperlipidemia, and CAD who presents with chest pain. The patient reports he has had intermittent, left-sided chest pain for the last 6 months. He explains that these episodes of chest pain last only a few minutes at the time, and it does not occur every day. The patient has been taking deep breaths to help him alleviate his pain during these episodes. Because he has been able to control his pain, he has not presented for evaluation of this pain over the last 6 months. Panchito denies abdominal pain, vomiting, diarrhea, and fever. The patient states that he has nitroglycerin if he needs it, but he has not been taking it. He has history of stent placement for his coronary artery disease. 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    Independent Historian:   None - Patient Only    Review of External Notes:   I reviewed his cardiology note from 12/29/2021.  Patient with a history of CAD, hypertension and diabetes.  PCI of the RCA in March 2021.  Patient was initially on Plavix post stent placement but after 1 year was discontinued on this.  For blood pressure management carvedilol and hydrochlorothiazide.    Medications:    Novolog pen  Zytiga  Zovirax  Lipitor  Casodex  Keflex  Catapres  Eligard  Ativan  Deltasone  Coreg  Aspirin 81 mg  Microzide  Nitrostat  Percocet  Cialis    Past Medical History:    Hypertension  Type 2 diabetes  Hypothyroidism  CKD  Hyperlipidemia  Thrombocytopenia  Arthritis  Colon polyps  GERD  Peripheral artery disease  Coronary atherosclerosis  Pulmonary nodule  Transient ischemic attack    Past Surgical History:    Cholecystectomy  Colonoscopy  Cataract removal  Inguinal hernia repair (B)  Implant prosthesis penis inflatable  TURP x2  Varicocelectomy (B)  Stent placement    Physical Exam   Patient Vitals for the past 24  "hrs:   BP Temp Pulse Resp SpO2 Height Weight   11/23/23 1233 (!) 210/114 -- 67 -- -- -- --   11/23/23 1009 (!) 191/114 -- 65 13 -- -- --   11/23/23 0735 (!) 200/93 97.1  F (36.2  C) 78 20 96 % 1.778 m (5' 10\") 88.5 kg (195 lb)      Physical Exam  General: Alert, appears well-developed and well-nourished. Cooperative.     In mild distress  HEENT:  Head:  Atraumatic  Ears:  External ears are normal  Mouth/Throat:  Oropharynx is without erythema or exudate and mucous membranes are moist.   Eyes:   Conjunctivae normal and EOM are normal. No scleral icterus.  CV:  Normal rate, regular rhythm, normal heart sounds and radial pulses are 2+ and symmetric.  No murmur.  Resp:  Breath sounds are clear bilaterally    Non-labored, no retractions or accessory muscle use  GI:  Abdomen is soft, no distension, no tenderness. No rebound or guarding.  No CVA tenderness bilaterally  MS:  Normal range of motion. No edema.    Normal strength in all 4 extremities.     Back atraumatic.    No midline cervical, thoracic, or lumbar tenderness  Skin:  Warm and dry.  No rash or lesions noted.  Neuro:   Alert. Normal strength.  GCS: 15  Psych: Normal mood and affect.    Emergency Department Course   ECG  ECG taken at 0736, ECG read at 0749  Sinus rhythm with 1st degree AV block  Otherwise normal ECG   No change as compared to prior, dated 8/7/13.  Rate 75 bpm. AK interval 264 ms. QRS duration 72 ms. QT/QTc 392/437 ms. P-R-T axes 35 -11 62.     Imaging:  CT Chest Pulmonary Embolism w Contrast   Final Result   IMPRESSION:   1.  No evidence of acute pulmonary thromboembolic disease. 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   2.  Mild bilateral hydronephrosis appears increased. This could be further evaluated with CTA of the abdomen and pelvis if clinically indicated.   3.  Additional unchanged findings as above.       "     Laboratory:  Labs Ordered and Resulted from Time of ED Arrival to Time of ED Departure   D DIMER QUANTITATIVE - Abnormal       Result Value    D-Dimer Quantitative 0.93 (*)    BASIC METABOLIC PANEL - Abnormal    Sodium 138      Potassium 3.8      Chloride 105      Carbon Dioxide (CO2) 24      Anion Gap 9      Urea Nitrogen 23.8 (*)     Creatinine 1.54 (*)     GFR Estimate 43 (*)     Calcium 9.6      Glucose 141 (*)    TROPONIN T, HIGH SENSITIVITY - Abnormal    Troponin T, High Sensitivity 44 (*)    CBC WITH PLATELETS AND DIFFERENTIAL - Abnormal    WBC Count 7.2      RBC Count 5.02      Hemoglobin 14.4      Hematocrit 43.8      MCV 87      MCH 28.7      MCHC 32.9      RDW 13.2      Platelet Count 116 (*)     % Neutrophils 59      % Lymphocytes 20      % Monocytes 18      % Eosinophils 3      % Basophils 0      % Immature Granulocytes 0      NRBCs per 100 WBC 0      Absolute Neutrophils 4.2      Absolute Lymphocytes 1.5      Absolute Monocytes 1.3      Absolute Eosinophils 0.2      Absolute Basophils 0.0      Absolute Immature Granulocytes 0.0      Absolute NRBCs 0.0     TROPONIN T, HIGH SENSITIVITY - Abnormal    Troponin T, High Sensitivity 42 (*)    MAGNESIUM - Normal    Magnesium 2.2     TSH WITH FREE T4 REFLEX - Normal    TSH 2.29     UA MACROSCOPIC WITH REFLEX TO MICRO AND CULTURE - Normal    Color Urine Light Yellow      Appearance Urine Clear      Glucose Urine Negative      Bilirubin Urine Negative      Ketones Urine Negative      Specific Gravity Urine 1.014      Blood Urine Negative      pH Urine 6.5      Protein Albumin Urine Negative      Urobilinogen Urine Normal      Nitrite Urine Negative      Leukocyte Esterase Urine Negative            Emergency Department Course & Assessments:      Interventions:  Medications   heparin infusion 25,000 units in D5W 250 mL ANTICOAGULANT (1,050 Units/hr Intravenous $New Bag 11/23/23 5616)   hydrALAZINE (APRESOLINE) injection 5 mg (has no administration in time  range)   iopamidol (ISOVUE-370) solution 71 mL (71 mLs Intravenous $Given 11/23/23 0940)   Saline flush (95 mLs Intravenous $Given 11/23/23 0945)   carvedilol (COREG) tablet 12.5 mg (12.5 mg Oral $Given 11/23/23 1233)   aspirin (ASA) tablet 325 mg (325 mg Oral $Given 11/23/23 1233)   heparin loading dose for LOW INTENSITY TREATMENT * Give BEFORE starting heparin infusion (5,300 Units Intravenous $Given 11/23/23 1236)        Assessments:  0751 I obtained history and examined the patient as noted above  0911 I rechecked the patient and explained findings  1112 I rechecked the patient again, wife now in room    Independent Interpretation (X-rays, CTs, rhythm strip):  None    Consultations/Discussion of Management or Tests:  1150 I spoke with Dr. Christianson, hospitalist, who accepts the patient for admission.        Social Determinants of Health affecting care:   None    Disposition:  The patient was admitted to the hospital under the care of Dr. Christianson.     Impression & Plan      Medical Decision Making:  Panchito Olivera is a 88 year old male who presents for evaluation of intermittent left sided chest pain similar to prior episodes of chest pain which led to stent placement and initial diagnosis of ACS.  Symptoms have been on/off for the last several weeks.  He is non compliant with his blood pressure medications.  His wife encouraged him to present to the ED today, although the patient is not interested in staying in the hospital as he'd like to return to his ranch in [place].      My clinical suspicion of acute coronary syndrome is high enough to warrant further therapy and investigation.  I will admit the patient  to medicine service for further workup.  The patient is pain free after aspirin.  After discussing with him the risks and benefits of heparinization and given lack of contraindication to this therapy, heparin bolus and drip were started given suspicion of acute coronary syndrome.      There is no clinical,  laboratory, or radiographic evidence of pulmonary embolism, AAA, aortic dissection, pneumonia or pneumothorax.     Of note on CT imaging of the chest there was incidentally found bilateral hydronephrosis.  Patient does not have any active flank pain.  Will defer to the admitting service to determine if any further workup would be necessary for this incidental finding of bilateral hydronephrosis.  Patient does have a history of CKD.  Renal function appears baseline for the patient today.    He agrees to be admitted and all questions were answered.    Diagnosis:    ICD-10-CM    1. Unstable angina (H)  I20.0       2. Bilateral hydronephrosis  N13.30       3. Chest pain, unspecified type  R07.9       4. Hypertensive urgency  I16.0       5. Non compliance w medication regimen  Z91.148            Scribe Disclosure:  Lokesh AMARAL, am serving as a scribe at 7:56 AM on 11/23/2023 to document services personally performed by Vaughn Trimble MD based on my observations and the provider's statements to me.   11/23/2023   Vaughn Trimble MD White, Scott, MD  11/23/23 8699

## 2023-11-23 NOTE — ED TRIAGE NOTES
Chest pain for the past 2 months off and on, hx of stents     Triage Assessment (Adult)       Row Name 11/23/23 0729          Triage Assessment    Airway WDL WDL        Respiratory WDL    Respiratory WDL WDL        Cardiac WDL    Cardiac WDL chest pain        Cognitive/Neuro/Behavioral WDL    Cognitive/Neuro/Behavioral WDL WDL

## 2023-11-23 NOTE — CONSULTS
[facility]    Cardiology Consult Note-Cardiology      Date of Admission:  11/23/2023  Reason for Consult: ACS    Assessment & Plan: HVSL   Panchito Olivera is a 88 year old male admitted on 11/23/2023. He has been having intermittent chest pain over the last 3 to 6 months that has increased in frequency over the last 2 weeks.  His pain feels similar to when he presented with ACS in 2021 and received a stent to his RCA.  He received nitroglycerin en route to the hospital and is currently pain-free and is hemodynamically stable.  He is ECGs do not show dynamic changes and his troponins are elevated but flat at 44 and 42 at baseline and after 2 hours respectively (creatinine 1.54).    He likely has unstable angina/crescendo angina and we will plan for invasive coronary angiography tomorrow.  He has been loaded with aspirin and is receiving IV heparin drip.  We will load him with Plavix as well and make him NPO from midnight    Unstable angina/crescendo angina  History of CAD - RCA stent 3/2021 in the context of unstable angina (angiogram on that occasion otherwise showed nonobstructive disease)  DM2 - HbA1C 6.5 7/2023  Hypertension - well controlled at home, elevated today   PAD - US 2021 showed distal left posterior tibial disease; no flow left dorsalis pedis  CKD - Baseline creatinine 1.4-1.5    Recommendations  -Load with Plavix 300 mg today and give her the 75 mg tomorrow  -Continue aspirin 81 mg once daily  -Continue with IV heparin drip  -N.p.o. from midnight  -Invasive coronary angiography tomorrow (ordered)  -Add amlodipine 5 mg once daily in light of elevated blood pressure  -Continue carvedilol 12.5 mg twice daily  -Echocardiogram - ordered and pending  -If he has further chest pain this can be managed with as needed sublingual nitroglycerin    Alex Mann [facility]    Clinically Significant Risk Factors Present on Admission                # Drug  "Induced Platelet Defect: home medication list includes an antiplatelet medication   # Hypertension: Noted on problem list     # DMII: A1C = 6.7 % (Ref range: <5.7 %) within past 6 months   # Overweight: Estimated body mass index is 27.52 kg/m  as calculated from the following:    Height as of this encounter: 1.778 m (5' 10\").    Weight as of this encounter: 87 kg (191 lb 12.8 oz).           _____________________________________________________________________    Chief Complaint   Chest pain    History of Present Illness   Panchito Olivera is a 88 year old male who has the following medical history:    1. DM2 - Hgb A1C 6.5 7/2023.  2. Hypertension - well controlled at home  3. CAD - RCA stent 3/2021 in the context of unstable angina (angiogram on that occasion otherwise showed nonobstructive disease)  4. PAD - US 2021 showed distal left posterior tibial disease; no flow left dorsalis pedis.  5. Metastatic prostate cancer - diagnosed 2000 s/p TURP x2. 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 On bicalutamide (started 10/2023) and leuprolide. Follows with [facility].  6. Hypothyroidism - on levothyroxine  7. CKD - baseline creatinine 1.4/1.5  8. Blind in left eye  9. TIA history    He has been having intermittent left-sided chest pain over the last 3 to 6 months occurring no more than a couple of times a week.  Over the last 2 weeks or so he has been having left-sided chest pain on a daily basis sometimes couple times a day as well.  The pain feels much of the pain that he had in 2021 when he was diagnosed with unstable angina and proceeded to have invasive coronary angiogram demonstrated significant proximal, mid and distal RCA disease for which he received a stent.  The pain feels like a heaviness that sometimes associated with numbness in his left upper extremity.  The pain does not feel as severe as it did in 2021 but feels similar in " quality.  He does not have any radiation to the jaw nor does he report any shortness of breath, diaphoresis, nausea or vomiting.  He is recently active and lifts weights 3 times a week.  He does mention that he has been having some emotional stress at home that has worsened over the last few weeks related to his marriage.  He presented to the emergency department due to current chest pain.  En route he did take sublingual nitroglycerin seem to improve his pain.    In the emergency department his systolic blood pressure was noted to be greater than 200 mmHg. his other vital signs were within normal limits.  His labs were notable for hemoglobin of 14.4, platelets 116, creatinine 1.54 (baseline 1.4-1.5) troponins of 44 and 42 at baseline and after 2 hours respectively.  He is ECG showed normal sinus rhythm without ischemic ST segment or T wave changes.  He did undergo a CT PA study which showed no evidence of acute PE.  There was evidence of mild bilateral hydronephrosis which appeared increased compared to previous.  He was started on IV heparin drip and loaded on aspirin.  At present he is pain-free and has no symptoms, although he does intermittently get upset about his interpersonal problems at home.    Past Medical History    Past Medical History:   Diagnosis Date    Hypertension     Type II or unspecified type diabetes mellitus without mention of complication, not stated as uncontrolled     Unspecified disorder of kidney and ureter     Unspecified hypothyroidism        Past Surgical History   Past Surgical History:   Procedure Laterality Date    CHOLECYSTECTOMY      COLONOSCOPY  5/7/2012    Procedure:COLONOSCOPY; colonoscopy; Surgeon:ROBE AVILEZ; Location: GI    EYE SURGERY      Cataract    HERNIA REPAIR      bilat ingunal    HERNIA REPAIR      IMPLANT PROSTHESIS PENIS INFLATABLE N/A 5/9/2018    Procedure: 3 PIECE PENILE PROSTHESIS;  Surgeon: Nigel Dia MD;  Location: [facility];  Service:      TRANSRECTAL ULTRASONIC, TRANSURETHRAL RESECTION (TUR) OF PROSTATE CYST      TURP  mandi 15,2013    TURP  1/2013    VARICOCELECTOMY BILATERAL Bilateral 1/24/2017    Procedure: VARICOCELECTOMY BILATERAL;  Surgeon: Diego Randle MD;  Location: [facility] OR       Medications   I have reviewed this patient's current medications      Review of Systems    The 10 point Review of Systems is negative other than noted in the HPI or here.     Physical Exam   Vital Signs: Temp: 97.8  F (36.6  C) Temp src: Oral BP: (!) 182/100 Pulse: 72   Resp: 18 SpO2: 96 % O2 Device: None (Room air)    Weight: 191 lbs 12.8 oz    Constitutional:  Appears stated age, well nourished.  Eyes: Pupils equal, round. Sclerae anicteric.   HEENT: Normocephalic, atraumatic.   Neck: Supple.  Respiratory: Breathing non-labored. Lungs clear to auscultation bilaterally. No crackles, wheezes, rhonchi, or rales.  Cardiovascular: Regular rate and rhythm, normal S1 and S2. No murmur, rub, or gallop.  GI: Soft, non-distended, non-tender, bowel sounds present in all four quadrants.  Skin: Warm, dry. No rashes, cyanosis, edema, or xanthelasma.  Musculoskeletal/Extremities: Moves all extremities well and symmetrically. No edema.  Neurologic: No gross focal deficits. Alert, awake, and oriented to person, place and time.  Psychiatric: Mentation normal. Does get emotional about interpersonal issues at home on a few pccasions/      Medical Decision Making       50 MINUTES SPENT BY ME on the date of service doing chart review, history, exam, documentation & further activities per the note.  ------------------ MEDICAL DECISION MAKING ------------------------------------------------------------------------------------------------------      Data   Imaging results reviewed over the past 24 hrs:   Recent Results (from the past 24 hour(s))   CT Chest Pulmonary Embolism w Contrast    Narrative    EXAM: CT CHEST PULMONARY EMBOLISM W CONTRAST  LOCATION: [facility]  DATE: 11/23/2023    INDICATION: Chest pain, elevated D dimer, concern for PE vs ACS  COMPARISON: CT 09/24/2021.  TECHNIQUE: CT chest pulmonary angiogram during arterial phase injection of IV contrast. Multiplanar reformats and MIP reconstructions were performed. Dose reduction techniques were used.   CONTRAST: 71mL ISOVUE 370    FINDINGS:  ANGIOGRAM CHEST: No evidence of acute pulmonary embolic disease. 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 The   thoracic aorta is not opacified with contrast and cannot be evaluated for dissection. There is a moderate burden of calcification of the thoracic aorta without aneurysmal dilatation.     LUNGS AND PLEURA: Stable right middle lobe nodule measuring 7 mm (series 7, image 157). Stable 4 mm subpleural nodule of the left upper lobe (series 7, image 89). Scattered bilateral subsegmental atelectasis. No pleural effusion.    MEDIASTINUM/AXILLAE: Small hiatal hernia. Normal heart size without pericardial effusion. No thoracic adenopathy.    CORONARY ARTERY CALCIFICATION: Previous intervention (stents or CABG).    UPPER ABDOMEN: Cholecystectomy. Left renal cyst. Mild bilateral hydronephrosis which appears increased compared to prior.    MUSCULOSKELETAL: No acute osseous findings.      Impression    IMPRESSION:  1.  No evidence of acute pulmonary thromboembolic disease. 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  2.  Mild bilateral hydronephrosis appears increased. This could be further evaluated with CTA of the abdomen and pelvis if clinically indicated.  3.  Additional unchanged findings as above.

## 2023-11-23 NOTE — PROGRESS NOTES
RECEIVING UNIT ED HANDOFF REVIEW    ED Nurse Handoff Report was reviewed by: Carlos Rodríguez RN on November 23, 2023 at 12:57 PM

## 2023-11-23 NOTE — PHARMACY-ADMISSION MEDICATION HISTORY
Pharmacist Admission Medication History    Admission medication history is complete. The information provided in this note is only as accurate as the sources available at the time of the update.    Information Source(s): Patient and CareEverywhere/SureScripts via in-person    Pertinent Information:   Patient has a Dexcom CGM which he uses, along with his activity level, to determine how much insulin to give himself. He states for BG of about 145 he would give 5 units if he were idle, for 210 he would give 12 units, and he would never give himself more than 14 units. He reports typical doses are around 8 or 9 units.     Recent antimicrobials:  On 11/21, patient was prescribed Keflex for UTI with instructions of TID x 7 days. Patient started the course of antibiotics yesterday. He has taken a total of 3 doses so far, including 1 from this morning.      Changes made to PTA medication list:  Added: bicalutamide, keflex  Deleted: hydrochlorothiazide, losartan, percocet, cialis, CoQ10  Changed: insulin 70/30 dose    Allergies reviewed with patient and updates made in EHR: yes    Medication History Completed By: Jena Douglas RPH 11/23/2023 1:24 PM    PTA Med List   Medication Sig Note Last Dose    aspirin 81 MG tablet Take 1 tablet by mouth daily.  11/22/2023 at HS    bicalutamide (CASODEX) 50 MG tablet Take 50 mg by mouth daily  11/23/2023    CARVEDILOL PO Take 12.5 mg by mouth 2 times daily (with meals)  11/23/2023    cephALEXin (KEFLEX) 500 MG capsule Take 500 mg by mouth 3 times daily  11/23/2023 at AM    Cyanocobalamin (VITAMIN  B-12) 250 MCG TABS Take 250 mcg by mouth every morning  11/23/2023    levothyroxine (SYNTHROID/LEVOTHROID) 175 MCG tablet Take 175 mcg by mouth daily 11/23/2023: No Surescripts fills of levothyroxine - pt unsure of dose. Documented as 175 mcg in [facility] careeverywhere records (last noted to be that dose 10/6/23 with TSH in July WNL). 11/23/2023    nitroglycerin (NITROSTAT) 0.4 MG SL tablet  Place 1 tablet under the tongue every 5 minutes as needed for chest pain.  Unknown    vitamin C with B complex (B COMPLEX-C) tablet Take 1 tablet by mouth daily  11/23/2023

## 2023-11-23 NOTE — PROGRESS NOTES
Patient is A/O x 4, Bp elevated, prn hydralazine, a-febrile,  denies chest pain, up independently to bathroom, cardiology and Urology following, tele SR with 1st deg AVB, patient on heparin gtt at 1050, next heparin 10a at 1845.

## 2023-11-23 NOTE — Clinical Note
The first balloon was inserted into the left anterior descending.Max pressure = 11 praveen. Total duration = 11 seconds.     Max pressure = 12 praveen. Total duration = 12 seconds.    Balloon reinflated a second time: Max pressure = 12 praveen. Total duration = 12 seconds.  Balloon reinflated a third time: Max pressure = 12 praveen. Total duration = 12 seconds.  Balloon reinflated a fourth time: Max pressure = 13 praveen. Total duration = 11 seconds.

## 2023-11-23 NOTE — CONSULTS
Urology Consult - 11/23/23    Name: Panchito Olivera    MRN: [id number]   YOB: 1935               Chief Complaint:   Bilateral Hydronephrosis    History is obtained from the patient and chart review          History of Present Illness:   Panchito Olivera is an 88 year old male with HTN, DM2, CAD w/prior cardiac stent, PAD, metastatic prostate cancer managed by [facility], prior urologic surgical history of TURP who required CIC post-op, IPP, varicocelectomy who presented with chest pain, found to have unstable angina/NSTEMI with plans for cardiac intervention tomorrow. He had CT chest performed that demonstrated bilateral hydronephrosis for which Urology is consulted.     Of note, several days prior patient was seen for malodorous urine, found to have aerococcus growing in his urine and was started on keflex. He has no fevers, chills, flank pain, or blood in his urine. He does endorse urinary frequency and nocturia 3. No pain or burning with urination. He does not feel like his bladder is distended. He has no prior history of kidney stones.          Past Medical History:     Past Medical History:   Diagnosis Date    Hypertension     Type II or unspecified type diabetes mellitus without mention of complication, not stated as uncontrolled     Unspecified disorder of kidney and ureter     Unspecified hypothyroidism             Past Surgical History:     Past Surgical History:   Procedure Laterality Date    CHOLECYSTECTOMY      COLONOSCOPY  5/7/2012    Procedure:COLONOSCOPY; colonoscopy; Surgeon:ROBE AVILEZ; Location: GI    EYE SURGERY      Cataract    HERNIA REPAIR      bilat ingunal    HERNIA REPAIR      IMPLANT PROSTHESIS PENIS INFLATABLE N/A 5/9/2018    Procedure: 3 PIECE PENILE PROSTHESIS;  Surgeon: Nigel Dia MD;  Location: [facility] [place];  Service:     TRANSRECTAL ULTRASONIC, TRANSURETHRAL RESECTION (TUR) OF PROSTATE CYST      TURP  robe 15,2013    TURP  1/2013     VARICOCELECTOMY BILATERAL Bilateral 1/24/2017    Procedure: VARICOCELECTOMY BILATERAL;  Surgeon: Diego Randle MD;  Location:  OR            Social History:     Social History     Tobacco Use    Smoking status: Never    Smokeless tobacco: Never   Substance Use Topics    Alcohol use: Yes     Alcohol/week: 4.0 standard drinks of alcohol            Family History:   No family history on file.         Allergies:     Allergies   Allergen Reactions    No Known Drug Allergy             Medications:     Current Facility-Administered Medications   Medication    acetaminophen (TYLENOL) tablet 650 mg    Or    acetaminophen (TYLENOL) Suppository 650 mg    alum & mag hydroxide-simethicone (MAALOX) suspension 30 mL    amLODIPine (NORVASC) tablet 5 mg    [START ON 11/24/2023] aspirin EC tablet 81 mg    [START ON 11/24/2023] bicalutamide (CASODEX) tablet 50 mg    calcium carbonate (TUMS) chewable tablet 1,000 mg    carvedilol (COREG) tablet 12.5 mg    clopidogrel (PLAVIX) tablet 300 mg    [START ON 11/24/2023] clopidogrel (PLAVIX) tablet 75 mg    [START ON 11/24/2023] cyanocobalamin (VITAMIN B-12) half-tab 250 mcg    glucose gel 15-30 g    Or    dextrose 50 % injection 25-50 mL    Or    glucagon injection 1 mg    heparin infusion 25,000 units in D5W 250 mL ANTICOAGULANT    hydrALAZINE (APRESOLINE) injection 10 mg    insulin aspart (NovoLOG) injection (RAPID ACTING)    insulin aspart (NovoLOG) injection (RAPID ACTING)    labetalol (NORMODYNE/TRANDATE) injection 10 mg    [START ON 11/24/2023] levothyroxine (SYNTHROID/LEVOTHROID) tablet 175 mcg    lidocaine (LMX4) cream    lidocaine (LMX4) cream    lidocaine 1 % 0.1-1 mL    lidocaine 1 % 0.1-1 mL    medication instruction    nitroGLYcerin (NITROSTAT) sublingual tablet 0.4 mg    ondansetron (ZOFRAN ODT) ODT tab 4 mg    Or    ondansetron (ZOFRAN) injection 4 mg    polyethylene glycol (MIRALAX) Packet 17 g    prochlorperazine (COMPAZINE) injection 5 mg    Or    prochlorperazine  "(COMPAZINE) tablet 5 mg    Or    prochlorperazine (COMPAZINE) suppository 12.5 mg    senna-docusate (SENOKOT-S/PERICOLACE) 8.6-50 MG per tablet 1 tablet    Or    senna-docusate (SENOKOT-S/PERICOLACE) 8.6-50 MG per tablet 2 tablet    sodium chloride (PF) 0.9% PF flush 3 mL    sodium chloride (PF) 0.9% PF flush 3 mL    sodium chloride (PF) 0.9% PF flush 3 mL    sodium chloride (PF) 0.9% PF flush 3 mL    [START ON 11/24/2023] vitamin B complex with vitamin C (STRESS TAB) tablet 1 tablet             Physical Exam:   VS:  T: 97.8    HR: 72    BP: 182/100    RR: 18   GEN:  Awake, alert, responds appropriately to questions  CV:  No cyanosis  LUNGS: Non-labored breathing  BACK:  No flank tenderness bilaterally  :  Voiding clear yellow urine  EXT:  Warm, well perfused.   SKIN:  Warm.  Dry.  No rashes.  NEURO:  CN grossly intact.            Data:   All laboratory data reviewed:    Recent Labs   Lab 11/23/23  0826   WBC 7.2   HGB 14.4   *       Recent Labs   Lab 11/23/23  0826      POTASSIUM 3.8   CHLORIDE 105   CO2 24   BUN 23.8*   CR 1.54*   *   MYRNA 9.6   MAG 2.2       Recent Labs   Lab 11/23/23  1240   COLOR Light Yellow   APPEARANCE Clear   URINEGLC Negative   URINEBILI Negative   URINEKETONE Negative   SG 1.014   URINEPH 6.5   PROTEIN Negative   NITRITE Negative   LEUKEST Negative       All pertinent imaging reviewed:    CT A/P  \"IMPRESSION:      1.  No acute abnormality, within the limitations of the noncontrast technique.     2.  No hydronephrosis.     3.  Distended and mildly trabeculated urinary bladder.     4.  Additional nonemergent findings as described in the report body.\"           Impression and Plan:   Impression:   Panchito Olivera is an 88 year old male with above mentioned history, currently admitted with NSTEMI, CT PE scan with bilateral hydronephrosis for which urology was consulted, now with CT A/P with improved hydronephrosis bilaterally and contrast extending down ureters, " large, distended bladder, afebrile, UA nitrite, LE and blood negative and voiding spontaneously clear yellow urine with Cr near baseline.    Discussed with patient that recommendation would be for casper catheter placement given his large, distended bladder, which he is opposed to at this time. We discussed this may pose long term risk to his kidneys, and he may consider catheter placement during his cardiac procedure tomorrow. Although CT evaluation of ureters is limited by contrast to identify any ureteral stones, creatinine at baseline, absence of renal colic and blood in urine makes ureteral stone less likely.    Given history of TURP requiring CIC post-op, as well as history of diabetes and CAD, possible etiologies of urinary retention include bladder neck contracture vs atonic bladder, though he reports no recent change in his urinary symptoms      Plan:     - Recommend casper catheter placement for urinary decompression, patient is currently refusing catheter due to prior poor tolerance of catheters/discomfort - he may be amenable to placement during his cardiac procedure tomorrow    - Trend Cr    - Okay for diet tonight from urologic perspective    - Urology will continue to follow. Please contact resident/PA on call with any questions or concerns.     Discussed with staff    Endy Shannon MD  Urology Resident, PGY-5

## 2023-11-23 NOTE — ED NOTES
"[facility]  ED Nurse Handoff Report    ED Chief complaint: Chest Pain      ED Diagnosis:   Final diagnoses:   Bilateral hydronephrosis   Chest pain, unspecified type   Unstable angina (H)   Hypertensive urgency       Code Status: Hospitalist to address    Allergies:   Allergies   Allergen Reactions    No Known Drug Allergy        Patient Story: Pt here d/t intermittent CP x the last 6 months  Focused Assessment:  Alert and oriented.  Pt hypertensive in ED.  C/O intermittent CP.  Pt up independently with a cane.      Treatments and/or interventions provided:   Patient's response to treatments and/or interventions:     To be done/followed up on inpatient unit:  See epic    Does this patient have any cognitive concerns?:     Activity level - Baseline/Home:  Independent  Activity Level - Current:   Stand with Assist    Patient's Preferred language: English   Needed?: No    Isolation: None  Infection: Not Applicable  Patient tested for COVID 19 prior to admission: YES  Bariatric?: No    Vital Signs:   Vitals:    11/23/23 0735 11/23/23 1009   BP: (!) 200/93 (!) 191/114   Pulse: 78 65   Resp: 20 13   Temp: 97.1  F (36.2  C)    SpO2: 96%    Weight: 88.5 kg (195 lb)    Height: 1.778 m (5' 10\")        Cardiac Rhythm:     Was the PSS-3 completed:   Yes  What interventions are required if any?               Family Comments:   OBS brochure/video discussed/provided to patient/family: Yes              Name of person given brochure if not patient:               Relationship to patient:     For the majority of the shift this patient's behavior was Green.   Behavioral interventions performed were .    ED NURSE PHONE NUMBER: [id number]        "

## 2023-11-23 NOTE — H&P
INTERNAL MEDICINE HISTORY AND PHYSICAL  [facility]      Panchito Olivera [MR#: [id number]  : 1935  88 year old male]  Date of Admission:  2023  Primary Care Provider:  No Ref-Primary, Physician      Chief Complaint:   Chest pain.    History of Present Illness:   Mr. Panchito Olivera is an 89 yo male with history including DM2; HTN; CAD with prior stent; PAD; and metastatic prostate cancer on treatment; who presents with chest pain.     Patient is normally very active.  He is still working and owns/runs runs multiple businesses.  Please he exercises regularly and lifts weights 3 times a week. Patient has had intermittent left-sided chest pain for the past 3-6 months.  These episodes typically were only lasting a few minutes at a time and did not occur every day.  He says that the symptoms are similar to when he required an RCA stent in .  Says that he has had increased stress today.  Overall, given his chest pain he called the nurse line about his symptoms inquiring about possibly getting a CT calcium score for further evaluation.  Instructed him to go to the ER for further evaluation.  Noted that in route he did receive nitroglycerin which seemed to help with his pain.    On initial evaluation, patient was hypertensive 200/93.  ECG showed sinus rhythm without acute ischemic changes.  Labs notable for BMP with BUN 23.8 creatinine 1.54; CBC with platelet count 116 K otherwise normal; troponin 44 initially his follow-up 42; D-dimer 0.93.    CT chest for PE showed no evidence of acute pulmonary thromboembolic disease, a nonocclusive linear filling defect within the left lower lobe segment pulmonary artery was unchanged compared to prior in retrospect may represent a chronic embolus/web; mild bilateral hydronephrosis appeared increased.      Past Medical History:   1. DM2. Hgb A1C 6.5 2023.  2. Hypertension (benign essential).  3. CAD. S/p RCA stent  3/2021.  4. PAD. US 2021 showed distal left posterior tibial disease; no flow left dorsalis pedis.  5. Metastatic prostate cancer. Diagnosed 2000 s/p TURP x2. 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 On bicalutamide (started 10/2023) and leuprolide.  Follows with [facility].  6. Hypothyroidism.  7. CKD. Baseline cr seems to be around mid 1 range.  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  9. TIA history.    From Care Everywhere:  Problem Noted Date Diagnosed Date   TIA (transient ischemic attack) 10/05/2022     Last Assessment & Plan:    Formatting of this note might be different from the original.  Discussed that statin therapy is strongly recommended, given his CAD, PAD, diabetes, age and now TIA. Start with atorvastatin 40; will need labs today and in 3 months.  - Discussed that since he had a TIA while on asa, would recommend increasing this. He is quite reluctant given his eye disease. Will have him consult with thrombosis clinic given this, as well as his chronic thrombocytopenia, CKD.  - consider referral to Neurology depending on MRI results, or if any new neurologic episodes   Exudative age-related macular degeneration of left eye with active choroidal neovascularization 10/04/2022     PAD (peripheral artery disease) 12/29/2021     Overview:    Formatting of this note might be different from the original.  Significant stenosis of the distal left PT, no flow seen in the left DP, ultrasound December 2021 for Nikki  Last Assessment & Plan:    Encourage walking regimen    Continue current medicines   Coronary atherosclerosis 03/11/2021     Overview:    Formatting of this note might be different from the original.  PCI RCA (Synergy 2.5x 28, 3x 48, 3.5x 48), 3/11/21. Follows with Dr Portillo in [place]. Initially on asa + Plavix, now just asa 81, and beta-blocker.   Unspecified convulsions 07/20/2019     Pulmonary nodule 07/06/2017     Overview:     Formatting of this note might be different from the original.  No follow-up needed 4/18-stable   S/P hernia repair 04/15/2014     HTN (hypertension) 04/10/2014     Overview:    Formatting of this note might be different from the original.  Carvedilol, hctz. Does check BP at home: 135-140/75-85. He exercises regularly.   Malignant neoplasm of prostate 03/21/2013     Overview:    Formatting of this note might be different from the original.  reportedly diagnosed in [place]; on Lupron     Colon polyps 04/24/2012     CKD (chronic kidney disease) stage 3, GFR 30-59 ml/min 01/27/2012     Thrombocytopenia 02/04/2011     Hypothyroidism 10/19/2010     Hyperlipidemia 05/09/2010     Type 2 diabetes mellitus with stage 3a chronic kidney disease, with long-term current use of insulin 11/01/2004     Overview:    Formatting of this note might be different from the original.  Last A1c 7/3 (10/2022)  - Taking premixed insulin 7/30, 50u bid with meals.  - Statin no. ACEi/ARB no.  - Retinopathy yes. Neuropathy no. Nephropathy yes.  - updated 11/17/22-     Other vitreous opacities, unspecified eye 11/01/2004     Aphakia 11/01/2004     Overview:    Formatting of this note might be different from the original.  No Implant S/p Surgery       Past Medical History:   Diagnosis Date    Hypertension     Type II or unspecified type diabetes mellitus without mention of complication, not stated as uncontrolled     Unspecified disorder of kidney and ureter     Unspecified hypothyroidism      Above past medical history reviewed and edited and/or added to as necessary.    Past Surgical History:   From Care Everywhere:  Surgical History  Surgery Date Site/Laterality Comments   CATARACT REMOVAL WITH IMPLANT     LW Problem: Cataract S/p Extraction w IOL LW Onset: 01Nov04    TURP 1/1/2013 - 1/31/2013       CHOLECYSTECTOMY 1/1/2000 - 12/31/2000   laparoscopic    HERNIA REPAIR 1/1/1980 - 12/31/1980       CORONARY STENT PLACEMENT 03/01/2021   In  [place]    CATARACT REMOVAL 1/1/1993 - 12/31/1993 Bilateral IOL RE, Aphakic OS    INTRAVITREAL INJECTION 05/17/2022 Left Avastin LE    INTRAVITREAL INJECTION 10/04/2022 Left Eylea OS sample today (pap in future)    INTRAVITREAL INJECTION 11/11/2022 Left Eylea OS    INTRAVITREAL INJECTION 06/02/2023 Left Eylea OS    INTRAVITREAL INJECTION 07/06/2023 Left Eylea OS    INTRAVITREAL INJECTION 10/06/2023 Left Eylea OS      Past Surgical History:   Procedure Laterality Date    CHOLECYSTECTOMY      COLONOSCOPY  5/7/2012    Procedure:COLONOSCOPY; colonoscopy; Surgeon:ROBE AVILEZ; Location: GI    EYE SURGERY      Cataract    HERNIA REPAIR      bilat ingunal    HERNIA REPAIR      IMPLANT PROSTHESIS PENIS INFLATABLE N/A 5/9/2018    Procedure: 3 PIECE PENILE PROSTHESIS;  Surgeon: Nigel Dia MD;  [place] [facility] [place]:     TRANSRECTAL ULTRASONIC, TRANSURETHRAL RESECTION (TUR) OF PROSTATE CYST      TURP  robe 15,2013    TURP  1/2013    VARICOCELECTOMY BILATERAL Bilateral 1/24/2017    Procedure: VARICOCELECTOMY BILATERAL;  Surgeon: Diego Randle MD;  [facility]        Allergies:     Allergies   Allergen Reactions    No Known Drug Allergy         Medications:     Prior to Admission Medications   Prescriptions Last Dose Informant Patient Reported? Taking?   CARVEDILOL PO   Yes No   Sig: Take 12.5 mg by mouth 2 times daily (with meals)   Coenzyme Q10 300 MG CAPS   Yes No   Sig: Take 300 mg by mouth every morning   Cyanocobalamin (VITAMIN  B-12) 250 MCG TABS   Yes No   Sig: Take 250 mcg by mouth every morning   LOSARTAN POTASSIUM PO   Yes No   Sig: Take 100 mg by mouth daily   aspirin 81 MG tablet   No No   Sig: Take 1 tablet by mouth daily.   glucose blood VI test strips strip   No No   Sig: by In Vitro route daily.   glucose blood VI test strips strip   No No   Sig: Check blood sugar twice daily   hydrochlorothiazide (MICROZIDE) 12.5 MG capsule   Yes No   Sig: Take 12.5 mg by mouth  every morning   insulin aspart protamine-insulin aspart (NOVOLOG MIX 70/30 FLEXPEN) SUSP 100 UNIT/ML   No No   Sig: Inject 5 -7 units under the skin twice daily before meals.   insulin pen needle (BD ULTRA-FINE PEN NEEDLES) 29G X 12.7MM MISC   No No   Sig: Use 2 daily or as directed.   levothyroxine (LEVOXYL) 150 MCG tablet   No No   Sig: Take 1 tablet by mouth daily.   nitroglycerin (NITROSTAT) 0.4 MG SL tablet   No No   Sig: Place 1 tablet under the tongue every 5 minutes as needed for chest pain.   oxyCODONE-acetaminophen (PERCOCET) 5-325 MG per tablet   No No   Sig: Take 1 tablet by mouth every 4 hours as needed for pain   tadalafil (CIALIS) 20 MG tablet   No No   Sig: Take 1 tablet by mouth. TAKEN AT LEAST 30 MINUTES BEFORE INTERCOURSE      Facility-Administered Medications: None       Social History:   . 3 children. Does not smoke. Does drink alcohol. 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 Is from [place] but has a home in [place] (2 children in [place]). Is [place] (been in  since he was around 22 yo).    Social History     Socioeconomic History    Marital status:      Spouse name: Not on file    Number of children: Not on file    Years of education: Not on file    Highest education level: Not on file   Occupational History    Not on file   Tobacco Use    Smoking status: Never    Smokeless tobacco: Never   Substance and Sexual Activity    Alcohol use: Yes     Alcohol/week: 4.0 standard drinks of alcohol    Drug use: No    Sexual activity: Yes     Partners: Female   Other Topics Concern    Parent/sibling w/ CABG, MI or angioplasty before 65F 55M? No   Social History Narrative    Balanced Diet - Yes    Osteoporosis Preventative measures-  Dairy servings per day: 2    Regular Exercise -  Yes Describe walk, run    Dental Exam up - YES - Date: 5/15/09    Eye Exam - YES - Date: with in 1 yr    Self Testicular Exam -  No    Do you have any concerns about STD's -   "Yes    Abuse: Current or Past (Physical, Sexual or Emotional)- No    Do you feel safe in your environment - Yes    Guns stored in the home - Pt denied to answer    Sunscreen used - Yes    Seatbelts used - Yes    Lipids - NO    Glucose -  NO    Colon Cancer Screening - Colonoscopy 1/3/04(date completed)    Hemoccults - UNKNOWN    PSA - NO    Digital Rectal Exam - UNKNOWN    Immunizations reviewed and up to date - ? Last TD    MEI Syed, [facility]                 Social Determinants of Health     Financial Resource Strain: Not on file   Food Insecurity: Not on file   Transportation Needs: Not on file   Physical Activity: Not on file   Stress: Not on file   Social Connections: Not on file   Interpersonal Safety: Not on file   Housing Stability: Not on file       Family History:   Reviewed.  No family history on file.    Review of Systems:   As noted in the HPI; otherwise 10 point review of systems was negative.     Physical Exam:   VITALS:  Blood pressure (!) 210/114, pulse 67, temperature 97.1  F (36.2  C), resp. rate 13, height 1.778 m (5' 10\"), weight 88.5 kg (195 lb), SpO2 96%.  General: Awake, alert, oriented, pleasant, conversant.  HEENT: Pupils equal round reactive, no scleral icterus or conjunctival injection.  Neck: No bruits, JVD or adenopathy.  Heart/Chest: Regular rate and rhythm, no murmurs, rubs or gallops.    Lungs:  Diminished in the bases, no crackles or wheezes.    Abdomen:  Soft, nontender, nondistended, positive bowel sounds.  Extremities/Musculoskeletal: Trace leg edema more pronounced on the right ankle.  Skin:    Neurologic:       Labs, Imaging & Other Data:     Results for orders placed or performed during the hospital encounter of 11/23/23   CT Chest Pulmonary Embolism w Contrast     Status: None    Narrative    EXAM: CT CHEST PULMONARY EMBOLISM W CONTRAST  LOC[facility]  DATE: 11/23/2023    INDICATION: Chest pain, elevated D dimer, concern for PE vs ACS  COMPARISON: CT " 09/24/2021.  TECHNIQUE: CT chest pulmonary angiogram during arterial phase injection of IV contrast. Multiplanar reformats and MIP reconstructions were performed. Dose reduction techniques were used.   CONTRAST: 71mL ISOVUE 370    FINDINGS:  ANGIOGRAM CHEST: No evidence of acute pulmonary embolic disease. 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 The   thoracic aorta is not opacified with contrast and cannot be evaluated for dissection. There is a moderate burden of calcification of the thoracic aorta without aneurysmal dilatation.     LUNGS AND PLEURA: Stable right middle lobe nodule measuring 7 mm (series 7, image 157). Stable 4 mm subpleural nodule of the left upper lobe (series 7, image 89). Scattered bilateral subsegmental atelectasis. No pleural effusion.    MEDIASTINUM/AXILLAE: Small hiatal hernia. Normal heart size without pericardial effusion. No thoracic adenopathy.    CORONARY ARTERY CALCIFICATION: Previous intervention (stents or CABG).    UPPER ABDOMEN: Cholecystectomy. Left renal cyst. Mild bilateral hydronephrosis which appears increased compared to prior.    MUSCULOSKELETAL: No acute osseous findings.      Impression    IMPRESSION:  1.  No evidence of acute pulmonary thromboembolic disease. 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  2.  Mild bilateral hydronephrosis appears increased. This could be further evaluated with CTA of the abdomen and pelvis if clinically indicated.  3.  Additional unchanged findings as above.   D dimer quantitative     Status: Abnormal   Result Value Ref Range    D-Dimer Quantitative 0.93 (H) 0.00 - 0.50 ug/mL FEU    Narrative    This D-dimer assay is intended for use in conjunction with a clinical  pretest probability assessment model to exclude pulmonary embolism (PE) and deep venous thrombosis (DVT) in outpatients suspected of PE or DVT. The cut-off value is 0.50 ug/mL FEU.    For patients 50 years of age or older, the application of age-adjusted cut-off values for D-Dimer may increase the specificity without significant effect on sensitivity. The literature suggested calculation age adjusted cut-off in ug/L = age in years x 10 ug/L. The results in this laboratory are reported as ug/mL rather than ug/L. The calculation for age adjusted cut off in ug/mL= age in years x 0.01 ug/mL. For example, the cut off for a 76 year old male is 76 x 0.01 ug/mL = 0.76 ug/mL (760 ug/L).    M Shelly et al. Age adjusted D-dimer cut-off levels to rule out pulmonary embolism: The ADJUST-PE Study. GERSON 2014;311:7287-8200.; HJ Chelo et al. Diagnostic accuracy of conventional or age adjusted D-dimer cutoff values in older patients with suspected venous thromboembolism. Systemic review and meta-analysis. BMJ 2013:346:f2492.   Basic metabolic panel     Status: Abnormal   Result Value Ref Range    Sodium 138 135 - 145 mmol/L    Potassium 3.8 3.4 - 5.3 mmol/L    Chloride 105 98 - 107 mmol/L    Carbon Dioxide (CO2) 24 22 - 29 mmol/L    Anion Gap 9 7 - 15 mmol/L    Urea Nitrogen 23.8 (H) 8.0 - 23.0 mg/dL    Creatinine 1.54 (H) 0.67 - 1.17 mg/dL    GFR Estimate 43 (L) >60 mL/min/1.73m2    Calcium 9.6 8.8 - 10.2 mg/dL    Glucose 141 (H) 70 - 99 mg/dL   Troponin T, High Sensitivity     Status: Abnormal   Result Value Ref Range    Troponin T, High Sensitivity 44 (H) <=22 ng/L   Magnesium     Status: Normal   Result Value Ref Range    Magnesium 2.2 1.7 - 2.3 mg/dL   TSH with free T4 reflex     Status: Normal   Result Value Ref Range    TSH 2.29 0.30 - 4.20 uIU/mL   CBC with platelets and differential     Status: Abnormal   Result Value Ref Range    WBC Count 7.2 4.0 - 11.0 10e3/uL    RBC Count 5.02 4.40 - 5.90 10e6/uL    Hemoglobin 14.4  13.3 - 17.7 g/dL    Hematocrit 43.8 40.0 - 53.0 %    MCV 87 78 - 100 fL    MCH 28.7 26.5 - 33.0 pg    MCHC 32.9 31.5 - 36.5 g/dL    RDW 13.2 10.0 - 15.0 %    Platelet Count 116 (L) 150 - 450 10e3/uL    % Neutrophils 59 %    % Lymphocytes 20 %    % Monocytes 18 %    % Eosinophils 3 %    % Basophils 0 %    % Immature Granulocytes 0 %    NRBCs per 100 WBC 0 <1 /100    Absolute Neutrophils 4.2 1.6 - 8.3 10e3/uL    Absolute Lymphocytes 1.5 0.8 - 5.3 10e3/uL    Absolute Monocytes 1.3 0.0 - 1.3 10e3/uL    Absolute Eosinophils 0.2 0.0 - 0.7 10e3/uL    Absolute Basophils 0.0 0.0 - 0.2 10e3/uL    Absolute Immature Granulocytes 0.0 <=0.4 10e3/uL    Absolute NRBCs 0.0 10e3/uL   Troponin T, High Sensitivity     Status: Abnormal   Result Value Ref Range    Troponin T, High Sensitivity 42 (H) <=22 ng/L   CBC with platelets differential     Status: Abnormal    Narrative    The following orders were created for panel order CBC with platelets differential.  Procedure                               Abnormality         Status                     ---------                               -----------         ------                     CBC with platelets and d...[[id number]]  Abnormal            Final result                 Please view results for these tests on the individual orders.         ASSESSMENT & PLAN:   Mr. Panchito Olivera is an 87 yo male with history including DM2; HTN; CAD with prior stent; PAD; and metastatic prostate cancer on treatment; who presents with chest pain.     On initial evaluation, patient was hypertensive 200/93.  ECG showed sinus rhythm without acute ischemic changes.  Labs notable for BMP with BUN 23.8 creatinine 1.54; CBC with platelet count 116 K otherwise normal; troponin 44 initially his follow-up 42; D-dimer 0.93.    CT chest for PE showed no evidence of acute pulmonary thromboembolic disease, a nonocclusive linear filling defect within the left lower lobe segment pulmonary artery was unchanged compared  to prior in retrospect may represent a chronic embolus/web; mild bilateral hydronephrosis appeared increased (comparison 9/2021).      Chest pain with trop elevation, concern for unstable angina/NSTEMI.  CAD with prior RCA stent 2021.  Hypertensive urgency.  * Initial presentation as above.  - Continue heparin gtt.  - Continue PTA ASA, carvedilol, losartan.  - Hold PTA HCTZ for now given contrast CT and possible need for LHC.  - Order PRN IV hydralazine and PRN IV labetalol.  - Echo.  - NPO after midnight.  - Cardiology consult, appreciate help.    Worsened bilateral hydronephrosis.  * Initial presentation and imaging as above.  - I discussed the CT findings with the patient.  Apparently he is aware of it to some extent, but given the worsening, he would like further evaluation here.  - Consult Urology, appreciate help.    Thrombocytopenia, appears to be chronic, unclear etiology, possibly medication effect or chronic from other cause.  * Plts 116K on admit. Plts have been in 90K to low 100K range over the past few years.  - Continue to monitor CBC.    Nonocclusive linear filling defect within the left lower lobe segment pulmonary artery on CT.  * Initial presentation as above. 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  - No specific treatment for now.  - Continue to monitor clinically.    Recent UTI.  * Prescribed cephalexin for 7d for UTI 11/21; cultures growing Aerococcus urinae, sensitivities pending (through Care Everywhere).  - Continue cephalexin through 11/27.  - Follow-up sensitivities.    DM2.   * Hgb A1C 6.5 7/2023.  - Moderate CHO diet.  - Order Tectura for now.    PAD.   * US 2021 showed distal left posterior tibial disease; no flow left dorsalis pedis.  - Continue ASA.    CKD.   * Baseline cr seems to be around mid 1 range.  * Cr 1.54 on admit.  - Hold  PTA HCTZ for now given contrast CT and possible need for LHC.  - Continue to monitor BMP.  - Avoid nephrotoxic medications.    Metastatic prostate cancer.  * Diagnosed 2000 s/p TURP x2. 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 On bicalutamide (started 10/2023) and leuprolide. Follows with [facility].  - Workup bilateral hydro as above.  - Otherwise, follow-up with [facility].    Hypothyroidism.  - Continue levothyroxine.    TIA history.  - Continue ASA.    Prophylaxis.  - PCD's, ambulation.   - On heparin gtt.    CODE STATUS: FULL      Panchito Christianson Jr., MD  [phone] (p)  Text Page  Vocera

## 2023-11-24 LAB
ACT BLD: 180 SECONDS (ref 74–150)
ACT BLD: 276 SECONDS (ref 74–150)
ANION GAP SERPL CALCULATED.3IONS-SCNC: 10 MMOL/L (ref 7–15)
ATRIAL RATE - MUSE: 75 BPM
BASOPHILS # BLD AUTO: 0 10E3/UL (ref 0–0.2)
BASOPHILS NFR BLD AUTO: 1 %
BUN SERPL-MCNC: 26.9 MG/DL (ref 8–23)
CALCIUM SERPL-MCNC: 9.3 MG/DL (ref 8.8–10.2)
CHLORIDE SERPL-SCNC: 105 MMOL/L (ref 98–107)
CREAT SERPL-MCNC: 1.66 MG/DL (ref 0.67–1.17)
DEPRECATED HCO3 PLAS-SCNC: 23 MMOL/L (ref 22–29)
DIASTOLIC BLOOD PRESSURE - MUSE: NORMAL MMHG
EGFRCR SERPLBLD CKD-EPI 2021: 40 ML/MIN/1.73M2
EOSINOPHIL # BLD AUTO: 0.2 10E3/UL (ref 0–0.7)
EOSINOPHIL NFR BLD AUTO: 3 %
ERYTHROCYTE [DISTWIDTH] IN BLOOD BY AUTOMATED COUNT: 13.2 % (ref 10–15)
GLUCOSE BLDC GLUCOMTR-MCNC: 122 MG/DL (ref 70–99)
GLUCOSE BLDC GLUCOMTR-MCNC: 227 MG/DL (ref 70–99)
GLUCOSE SERPL-MCNC: 102 MG/DL (ref 70–99)
HCT VFR BLD AUTO: 38.7 % (ref 40–53)
HGB BLD-MCNC: 12.7 G/DL (ref 13.3–17.7)
HOLD SPECIMEN: NORMAL
IMM GRANULOCYTES # BLD: 0 10E3/UL
IMM GRANULOCYTES NFR BLD: 0 %
INR PPP: 1.05 (ref 0.85–1.15)
INTERPRETATION ECG - MUSE: NORMAL
LYMPHOCYTES # BLD AUTO: 1.5 10E3/UL (ref 0.8–5.3)
LYMPHOCYTES NFR BLD AUTO: 25 %
MCH RBC QN AUTO: 28.3 PG (ref 26.5–33)
MCHC RBC AUTO-ENTMCNC: 32.8 G/DL (ref 31.5–36.5)
MCV RBC AUTO: 86 FL (ref 78–100)
MONOCYTES # BLD AUTO: 1.2 10E3/UL (ref 0–1.3)
MONOCYTES NFR BLD AUTO: 20 %
NEUTROPHILS # BLD AUTO: 3.2 10E3/UL (ref 1.6–8.3)
NEUTROPHILS NFR BLD AUTO: 51 %
NRBC # BLD AUTO: 0 10E3/UL
NRBC BLD AUTO-RTO: 0 /100
P AXIS - MUSE: 35 DEGREES
PLATELET # BLD AUTO: 104 10E3/UL (ref 150–450)
POTASSIUM SERPL-SCNC: 3.9 MMOL/L (ref 3.4–5.3)
PR INTERVAL - MUSE: 264 MS
QRS DURATION - MUSE: 72 MS
QT - MUSE: 392 MS
QTC - MUSE: 437 MS
R AXIS - MUSE: -11 DEGREES
RBC # BLD AUTO: 4.48 10E6/UL (ref 4.4–5.9)
SODIUM SERPL-SCNC: 138 MMOL/L (ref 135–145)
SYSTOLIC BLOOD PRESSURE - MUSE: NORMAL MMHG
T AXIS - MUSE: 62 DEGREES
UFH PPP CHRO-ACNC: 0.29 IU/ML
UFH PPP CHRO-ACNC: 0.47 IU/ML
VENTRICULAR RATE- MUSE: 75 BPM
WBC # BLD AUTO: 6.2 10E3/UL (ref 4–11)

## 2023-11-24 PROCEDURE — 99152 MOD SED SAME PHYS/QHP 5/>YRS: CPT | Performed by: INTERNAL MEDICINE

## 2023-11-24 PROCEDURE — C1769 GUIDE WIRE: HCPCS | Performed by: INTERNAL MEDICINE

## 2023-11-24 PROCEDURE — C9600 PERC DRUG-EL COR STENT SING: HCPCS | Performed by: INTERNAL MEDICINE

## 2023-11-24 PROCEDURE — 250N000013 HC RX MED GY IP 250 OP 250 PS 637: Performed by: INTERNAL MEDICINE

## 2023-11-24 PROCEDURE — C1894 INTRO/SHEATH, NON-LASER: HCPCS | Performed by: INTERNAL MEDICINE

## 2023-11-24 PROCEDURE — 027034Z DILATION OF CORONARY ARTERY, ONE ARTERY WITH DRUG-ELUTING INTRALUMINAL DEVICE, PERCUTANEOUS APPROACH: ICD-10-PCS | Performed by: INTERNAL MEDICINE

## 2023-11-24 PROCEDURE — 36415 COLL VENOUS BLD VENIPUNCTURE: CPT | Performed by: HOSPITALIST

## 2023-11-24 PROCEDURE — C1760 CLOSURE DEV, VASC: HCPCS | Performed by: INTERNAL MEDICINE

## 2023-11-24 PROCEDURE — 99233 SBSQ HOSP IP/OBS HIGH 50: CPT | Performed by: HOSPITALIST

## 2023-11-24 PROCEDURE — 210N000002 HC R&B HEART CARE

## 2023-11-24 PROCEDURE — 93005 ELECTROCARDIOGRAM TRACING: CPT

## 2023-11-24 PROCEDURE — 92928 PRQ TCAT PLMT NTRAC ST 1 LES: CPT | Mod: LD | Performed by: INTERNAL MEDICINE

## 2023-11-24 PROCEDURE — 93010 ELECTROCARDIOGRAM REPORT: CPT | Performed by: INTERNAL MEDICINE

## 2023-11-24 PROCEDURE — 272N000001 HC OR GENERAL SUPPLY STERILE: Performed by: INTERNAL MEDICINE

## 2023-11-24 PROCEDURE — 85347 COAGULATION TIME ACTIVATED: CPT

## 2023-11-24 PROCEDURE — 93454 CORONARY ARTERY ANGIO S&I: CPT | Performed by: INTERNAL MEDICINE

## 2023-11-24 PROCEDURE — B2111ZZ FLUOROSCOPY OF MULTIPLE CORONARY ARTERIES USING LOW OSMOLAR CONTRAST: ICD-10-PCS | Performed by: INTERNAL MEDICINE

## 2023-11-24 PROCEDURE — 93454 CORONARY ARTERY ANGIO S&I: CPT | Mod: 26 | Performed by: INTERNAL MEDICINE

## 2023-11-24 PROCEDURE — 85610 PROTHROMBIN TIME: CPT | Performed by: HOSPITALIST

## 2023-11-24 PROCEDURE — 85520 HEPARIN ASSAY: CPT | Performed by: HOSPITALIST

## 2023-11-24 PROCEDURE — 99233 SBSQ HOSP IP/OBS HIGH 50: CPT | Mod: 25 | Performed by: INTERNAL MEDICINE

## 2023-11-24 PROCEDURE — C1887 CATHETER, GUIDING: HCPCS | Performed by: INTERNAL MEDICINE

## 2023-11-24 PROCEDURE — 80048 BASIC METABOLIC PNL TOTAL CA: CPT | Performed by: INTERNAL MEDICINE

## 2023-11-24 PROCEDURE — 258N000003 HC RX IP 258 OP 636: Performed by: INTERNAL MEDICINE

## 2023-11-24 PROCEDURE — C1725 CATH, TRANSLUMIN NON-LASER: HCPCS | Performed by: INTERNAL MEDICINE

## 2023-11-24 PROCEDURE — 85520 HEPARIN ASSAY: CPT | Performed by: INTERNAL MEDICINE

## 2023-11-24 PROCEDURE — 250N000013 HC RX MED GY IP 250 OP 250 PS 637: Performed by: HOSPITALIST

## 2023-11-24 PROCEDURE — 36415 COLL VENOUS BLD VENIPUNCTURE: CPT | Performed by: INTERNAL MEDICINE

## 2023-11-24 PROCEDURE — 85041 AUTOMATED RBC COUNT: CPT | Performed by: INTERNAL MEDICINE

## 2023-11-24 PROCEDURE — 250N000011 HC RX IP 250 OP 636: Performed by: INTERNAL MEDICINE

## 2023-11-24 PROCEDURE — 99153 MOD SED SAME PHYS/QHP EA: CPT | Performed by: INTERNAL MEDICINE

## 2023-11-24 PROCEDURE — 250N000011 HC RX IP 250 OP 636: Mod: JZ | Performed by: INTERNAL MEDICINE

## 2023-11-24 PROCEDURE — 250N000009 HC RX 250: Performed by: INTERNAL MEDICINE

## 2023-11-24 PROCEDURE — C1874 STENT, COATED/COV W/DEL SYS: HCPCS | Performed by: INTERNAL MEDICINE

## 2023-11-24 DEVICE — CLOSURE ANGIOSEAL 6FR 610130: Type: IMPLANTABLE DEVICE | Status: FUNCTIONAL

## 2023-11-24 DEVICE — IMPLANTABLE DEVICE: Type: IMPLANTABLE DEVICE | Status: FUNCTIONAL

## 2023-11-24 RX ORDER — FENTANYL CITRATE 50 UG/ML
25 INJECTION, SOLUTION INTRAMUSCULAR; INTRAVENOUS
Status: DISCONTINUED | OUTPATIENT
Start: 2023-11-24 | End: 2023-11-25 | Stop reason: HOSPADM

## 2023-11-24 RX ORDER — ASPIRIN 81 MG/1
243 TABLET, CHEWABLE ORAL ONCE
Status: COMPLETED | OUTPATIENT
Start: 2023-11-24 | End: 2023-11-24

## 2023-11-24 RX ORDER — NALOXONE HYDROCHLORIDE 0.4 MG/ML
0.2 INJECTION, SOLUTION INTRAMUSCULAR; INTRAVENOUS; SUBCUTANEOUS
Status: DISCONTINUED | OUTPATIENT
Start: 2023-11-24 | End: 2023-11-25 | Stop reason: HOSPADM

## 2023-11-24 RX ORDER — HYDRALAZINE HYDROCHLORIDE 20 MG/ML
10 INJECTION INTRAMUSCULAR; INTRAVENOUS EVERY 4 HOURS PRN
Status: DISCONTINUED | OUTPATIENT
Start: 2023-11-24 | End: 2023-11-25 | Stop reason: HOSPADM

## 2023-11-24 RX ORDER — ASPIRIN 81 MG/1
81 TABLET, CHEWABLE ORAL ONCE
Status: DISCONTINUED | OUTPATIENT
Start: 2023-11-24 | End: 2023-11-24

## 2023-11-24 RX ORDER — IOPAMIDOL 755 MG/ML
INJECTION, SOLUTION INTRAVASCULAR
Status: DISCONTINUED | OUTPATIENT
Start: 2023-11-24 | End: 2023-11-24 | Stop reason: HOSPADM

## 2023-11-24 RX ORDER — CLOPIDOGREL BISULFATE 75 MG/1
75 TABLET ORAL DAILY
Status: DISCONTINUED | OUTPATIENT
Start: 2023-11-25 | End: 2023-11-25 | Stop reason: HOSPADM

## 2023-11-24 RX ORDER — ASPIRIN 81 MG/1
81 TABLET ORAL DAILY
Status: DISCONTINUED | OUTPATIENT
Start: 2023-11-25 | End: 2023-11-25 | Stop reason: HOSPADM

## 2023-11-24 RX ORDER — CYCLOBENZAPRINE HCL 5 MG
10 TABLET ORAL 3 TIMES DAILY
Status: DISCONTINUED | OUTPATIENT
Start: 2023-11-24 | End: 2023-11-24

## 2023-11-24 RX ORDER — LIDOCAINE 40 MG/G
CREAM TOPICAL
Status: DISCONTINUED | OUTPATIENT
Start: 2023-11-24 | End: 2023-11-24 | Stop reason: HOSPADM

## 2023-11-24 RX ORDER — ASPIRIN 81 MG/1
81 TABLET ORAL DAILY
Qty: 30 TABLET | Refills: 3 | Status: SHIPPED | OUTPATIENT
Start: 2023-11-25 | End: 2023-11-25

## 2023-11-24 RX ORDER — ONDANSETRON 2 MG/ML
4 INJECTION INTRAMUSCULAR; INTRAVENOUS EVERY 6 HOURS PRN
Status: DISCONTINUED | OUTPATIENT
Start: 2023-11-24 | End: 2023-11-24

## 2023-11-24 RX ORDER — CYCLOBENZAPRINE HCL 5 MG
5 TABLET ORAL ONCE
Status: COMPLETED | OUTPATIENT
Start: 2023-11-24 | End: 2023-11-24

## 2023-11-24 RX ORDER — LORAZEPAM 0.5 MG/1
0.5 TABLET ORAL
Status: DISCONTINUED | OUTPATIENT
Start: 2023-11-24 | End: 2023-11-24 | Stop reason: HOSPADM

## 2023-11-24 RX ORDER — FLUMAZENIL 0.1 MG/ML
0.2 INJECTION, SOLUTION INTRAVENOUS
Status: ACTIVE | OUTPATIENT
Start: 2023-11-24 | End: 2023-11-25

## 2023-11-24 RX ORDER — AMLODIPINE BESYLATE 5 MG/1
5 TABLET ORAL 2 TIMES DAILY
Status: DISCONTINUED | OUTPATIENT
Start: 2023-11-24 | End: 2023-11-25 | Stop reason: HOSPADM

## 2023-11-24 RX ORDER — ACETAMINOPHEN 325 MG/1
650 TABLET ORAL EVERY 4 HOURS PRN
Status: DISCONTINUED | OUTPATIENT
Start: 2023-11-24 | End: 2023-11-24

## 2023-11-24 RX ORDER — FENTANYL CITRATE 50 UG/ML
INJECTION, SOLUTION INTRAMUSCULAR; INTRAVENOUS
Status: DISCONTINUED | OUTPATIENT
Start: 2023-11-24 | End: 2023-11-24 | Stop reason: HOSPADM

## 2023-11-24 RX ORDER — LORAZEPAM 2 MG/ML
0.5 INJECTION INTRAMUSCULAR
Status: DISCONTINUED | OUTPATIENT
Start: 2023-11-24 | End: 2023-11-24 | Stop reason: HOSPADM

## 2023-11-24 RX ORDER — ATROPINE SULFATE 0.1 MG/ML
0.5 INJECTION INTRAVENOUS
Status: ACTIVE | OUTPATIENT
Start: 2023-11-24 | End: 2023-11-25

## 2023-11-24 RX ORDER — OXYCODONE HYDROCHLORIDE 5 MG/1
5 TABLET ORAL EVERY 4 HOURS PRN
Status: DISCONTINUED | OUTPATIENT
Start: 2023-11-24 | End: 2023-11-25 | Stop reason: HOSPADM

## 2023-11-24 RX ORDER — CYCLOBENZAPRINE HCL 5 MG
5 TABLET ORAL 3 TIMES DAILY
Status: COMPLETED | OUTPATIENT
Start: 2023-11-24 | End: 2023-11-24

## 2023-11-24 RX ORDER — NITROGLYCERIN 5 MG/ML
VIAL (ML) INTRAVENOUS
Status: DISCONTINUED | OUTPATIENT
Start: 2023-11-24 | End: 2023-11-24 | Stop reason: HOSPADM

## 2023-11-24 RX ORDER — CYCLOBENZAPRINE HCL 5 MG
5 TABLET ORAL 3 TIMES DAILY
Status: DISCONTINUED | OUTPATIENT
Start: 2023-11-24 | End: 2023-11-24

## 2023-11-24 RX ORDER — NITROGLYCERIN 0.4 MG/1
0.4 TABLET SUBLINGUAL EVERY 5 MIN PRN
Status: DISCONTINUED | OUTPATIENT
Start: 2023-11-24 | End: 2023-11-25 | Stop reason: HOSPADM

## 2023-11-24 RX ORDER — POTASSIUM CHLORIDE 1500 MG/1
20 TABLET, EXTENDED RELEASE ORAL
Status: DISCONTINUED | OUTPATIENT
Start: 2023-11-24 | End: 2023-11-24 | Stop reason: HOSPADM

## 2023-11-24 RX ORDER — ONDANSETRON 4 MG/1
4 TABLET, ORALLY DISINTEGRATING ORAL EVERY 6 HOURS PRN
Status: DISCONTINUED | OUTPATIENT
Start: 2023-11-24 | End: 2023-11-24

## 2023-11-24 RX ORDER — CLOPIDOGREL BISULFATE 75 MG/1
TABLET ORAL
Status: DISCONTINUED | OUTPATIENT
Start: 2023-11-24 | End: 2023-11-24 | Stop reason: HOSPADM

## 2023-11-24 RX ORDER — NALOXONE HYDROCHLORIDE 0.4 MG/ML
0.4 INJECTION, SOLUTION INTRAMUSCULAR; INTRAVENOUS; SUBCUTANEOUS
Status: DISCONTINUED | OUTPATIENT
Start: 2023-11-24 | End: 2023-11-25 | Stop reason: HOSPADM

## 2023-11-24 RX ORDER — SODIUM CHLORIDE 9 MG/ML
INJECTION, SOLUTION INTRAVENOUS CONTINUOUS
Status: ACTIVE | OUTPATIENT
Start: 2023-11-24 | End: 2023-11-24

## 2023-11-24 RX ORDER — HEPARIN SODIUM 10000 [USP'U]/100ML
100-1000 INJECTION, SOLUTION INTRAVENOUS CONTINUOUS PRN
Status: DISCONTINUED | OUTPATIENT
Start: 2023-11-24 | End: 2023-11-24 | Stop reason: HOSPADM

## 2023-11-24 RX ORDER — DIAZEPAM 10 MG/2ML
2.5 INJECTION, SOLUTION INTRAMUSCULAR; INTRAVENOUS ONCE
Status: COMPLETED | OUTPATIENT
Start: 2023-11-24 | End: 2023-11-24

## 2023-11-24 RX ORDER — SODIUM CHLORIDE 9 MG/ML
INJECTION, SOLUTION INTRAVENOUS CONTINUOUS
Status: DISCONTINUED | OUTPATIENT
Start: 2023-11-24 | End: 2023-11-24 | Stop reason: HOSPADM

## 2023-11-24 RX ORDER — METOPROLOL TARTRATE 1 MG/ML
5 INJECTION, SOLUTION INTRAVENOUS
Status: DISCONTINUED | OUTPATIENT
Start: 2023-11-24 | End: 2023-11-25 | Stop reason: HOSPADM

## 2023-11-24 RX ORDER — HEPARIN SODIUM 1000 [USP'U]/ML
INJECTION, SOLUTION INTRAVENOUS; SUBCUTANEOUS
Status: DISCONTINUED | OUTPATIENT
Start: 2023-11-24 | End: 2023-11-24 | Stop reason: HOSPADM

## 2023-11-24 RX ORDER — OXYCODONE HYDROCHLORIDE 5 MG/1
10 TABLET ORAL EVERY 4 HOURS PRN
Status: DISCONTINUED | OUTPATIENT
Start: 2023-11-24 | End: 2023-11-25 | Stop reason: HOSPADM

## 2023-11-24 RX ORDER — AMOXICILLIN 500 MG/1
500 CAPSULE ORAL EVERY 8 HOURS SCHEDULED
Status: DISCONTINUED | OUTPATIENT
Start: 2023-11-24 | End: 2023-11-25 | Stop reason: HOSPADM

## 2023-11-24 RX ORDER — ASPIRIN 325 MG
325 TABLET ORAL ONCE
Status: COMPLETED | OUTPATIENT
Start: 2023-11-24 | End: 2023-11-24

## 2023-11-24 RX ADMIN — CYANOCOBALAMIN TAB 500 MCG 250 MCG: 500 TAB at 08:18

## 2023-11-24 RX ADMIN — ACETAMINOPHEN 650 MG: 325 TABLET, FILM COATED ORAL at 17:11

## 2023-11-24 RX ADMIN — AMLODIPINE BESYLATE 5 MG: 5 TABLET ORAL at 21:24

## 2023-11-24 RX ADMIN — AMLODIPINE BESYLATE 5 MG: 5 TABLET ORAL at 08:16

## 2023-11-24 RX ADMIN — MAGNESIUM SULFATE HEPTAHYDRATE 1 G: 500 INJECTION, SOLUTION INTRAMUSCULAR; INTRAVENOUS at 18:42

## 2023-11-24 RX ADMIN — CARVEDILOL 12.5 MG: 12.5 TABLET, FILM COATED ORAL at 08:16

## 2023-11-24 RX ADMIN — ASPIRIN 81 MG: 81 TABLET, COATED ORAL at 08:17

## 2023-11-24 RX ADMIN — CYCLOBENZAPRINE HYDROCHLORIDE 5 MG: 5 TABLET, FILM COATED ORAL at 17:49

## 2023-11-24 RX ADMIN — CYCLOBENZAPRINE HYDROCHLORIDE 5 MG: 5 TABLET, FILM COATED ORAL at 18:14

## 2023-11-24 RX ADMIN — HEPARIN SODIUM 850 UNITS/HR: 10000 INJECTION, SOLUTION INTRAVENOUS at 12:46

## 2023-11-24 RX ADMIN — B-COMPLEX W/ C & FOLIC ACID TAB 1 TABLET: TAB at 08:16

## 2023-11-24 RX ADMIN — AMOXICILLIN 500 MG: 500 CAPSULE ORAL at 15:31

## 2023-11-24 RX ADMIN — AMOXICILLIN 500 MG: 500 CAPSULE ORAL at 21:23

## 2023-11-24 RX ADMIN — ASPIRIN 81 MG CHEWABLE TABLET 243 MG: 81 TABLET CHEWABLE at 10:13

## 2023-11-24 RX ADMIN — SODIUM CHLORIDE: 9 INJECTION, SOLUTION INTRAVENOUS at 17:53

## 2023-11-24 RX ADMIN — SODIUM CHLORIDE: 9 INJECTION, SOLUTION INTRAVENOUS at 10:08

## 2023-11-24 RX ADMIN — CARVEDILOL 12.5 MG: 12.5 TABLET, FILM COATED ORAL at 17:49

## 2023-11-24 RX ADMIN — CLOPIDOGREL BISULFATE 75 MG: 75 TABLET ORAL at 08:17

## 2023-11-24 RX ADMIN — DIAZEPAM 2.5 MG: 5 INJECTION, SOLUTION INTRAMUSCULAR; INTRAVENOUS at 18:37

## 2023-11-24 ASSESSMENT — ACTIVITIES OF DAILY LIVING (ADL)
ADLS_ACUITY_SCORE: 37

## 2023-11-24 NOTE — PROGRESS NOTES
Patient is refusing to take Keflex home medication because causing side effect treating for UTI transition to ceftriaxone at this time

## 2023-11-24 NOTE — PROGRESS NOTES
"Urology  Progress Note    - Casper placed overnight after bladder scan for ~1L  - Tolerating catheter  - Afeb, VSS    Exam  /78 (BP Location: Left arm)   Pulse 55   Temp 98.6  F (37  C) (Oral)   Resp 18   Ht 1.778 m (5' 10\")   Wt 85.4 kg (188 lb 3.2 oz)   SpO2 95%   BMI 27.00 kg/m    No acute distress  Unlabored breathing  Casper with clear yellow urine in tubing    UOP 1770/nr    Labs  WBC 6.2 (7.2)  Hgb 12.7 (14.4)  Cr 1.66 (1.54)    Assessment/Plan  88 year old male with DM2, HTN, CAD, PAD, CKD, admitted with NSTEMI, initially with concern for worsening hydronephrosis on CT PE with no hydronephrosis demonstrated on CT A/P but distended bladder, now with catheter in place draining clear yellow urine after bladder scan for ~1L overnight.    - Continue casper catheter for at least 1 week- if patient to be discharged prior to 1 week, notify urology and we will coordinate outpatient follow up for TOV  - Given history of TURP, DM2, bladder distension, patient will benefit from outpatient follow-up as there may be a chronic component to his urinary retention  - Minimize narcotics, anticholinergics which may exacerbate urinary retention  - Urology will follow peripherally    Seen and examined, to be discussed with staff    Endy Shannon MD  Urology Resident, PGY-5         "

## 2023-11-24 NOTE — PRE-PROCEDURE
GENERAL PRE-PROCEDURE:   Procedure:  Coronary angiogram  Date/Time:  11/24/2023 3:55 PM    Verbal consent obtained?: Yes    Written consent obtained?: Yes    Risks and benefits: Risks, benefits and alternatives were discussed    Consent given by:  Patient  Patient states understanding of procedure being performed: Yes    Patient's understanding of procedure matches consent: Yes    Procedure consent matches procedure scheduled: Yes    Expected level of sedation:  Moderate  Appropriately NPO:  Yes  ASA Class:  3  Mallampati  :  Grade 3- soft palate visible, posterior pharyngeal wall not visible  Lungs:  Lungs clear with good breath sounds bilaterally  Heart:  Normal heart sounds and rate  History & Physical reviewed:  History and physical reviewed and no updates needed  Statement of review:  I have reviewed the lab findings, diagnostic data, medications, and the plan for sedation

## 2023-11-24 NOTE — PROVIDER NOTIFICATION
MD Notification    Notified Person: MD    Notified Person Name: Kristian    Notification Date/Time: 11/23/23 4233    Notification Interaction: Vocera message    Purpose of Notification:  previous nurse had paged you regarding pt's concern with abx. You changed abx to Rocephin. Pt is refusing IV abx, and is request only PO abx. Please adviseRashida [phone]    Orders Received:  Please document pt refuse no change     Comments:

## 2023-11-24 NOTE — PROGRESS NOTES
[facility]  Cardiology Progress Note  Date of Service: 11/24/2023    Assessment & Plan    Panchito Olivera is a 88 year old male admitted on 11/23/2023 with worsening chest discomfort.    Assessment:  1.  Unstable angina/crescendo angina.  2.  Coronary artery disease with history of RCA stent 3/2021  3.  Diabetes mellitus type 2, hemoglobin A1c 6.5% 7/2023  4.  Hypertension, uncontrolled  5.  PAD  6.  CKD, baseline creatinine 1.5-1.6  7.  Metastatic prostate cancer, dx 2000 s/p TURP x 2. Now with metastatic disease, on bicalutamide and leuprolide, started 10/2023.    Plan:   1.  Coronary angiogram +/- PCI today, 11/24/2023.  No history of contrast dye allergy. Creatinine at baseline 1.5-1.6.   -Continue NPO   -Consent obtained  2.  Echocardiogram today  3.  Medications   -Continue IV heparin  -Increase amlodipine to 5 mg twice daily given ongoing HTN  -Continue ASA 81 mg once daily   -Continue Plavix 75 mg once daily   -Continue carvedilol 12.5 mg twice daily  4.  Cardiology will continue to follow.  Further recommendations pending outcome of #1    Risks and benefits of coronary angiogram +/- percutaneous coronary intervention (PCI) were discussed with the patient including but not limited to bleeding, bruising, infection, allergic reaction, kidney damage (including need for dialysis), stroke, heart attack, vascular damage, emergency open heart surgery, and death.  Patient verbalized understanding and wishes to proceed.      Alexandra Sandhu PA-C  [facility] - [facility]  Pager: [phone]    Interval History   No acute events overnight. Chest pain free. Remains NPO, awaiting cor angio today.     Physical Exam   Temp: 97.2  F (36.2  C) Temp src: Oral BP: (!) 148/85 Pulse: 63   Resp: 18 SpO2: 95 % O2 Device: None (Room air)    Vitals:    11/23/23 0735 11/23/23 1354 11/24/23 0650   Weight: 88.5 kg (195 lb) 87 kg (191 lb 12.8 oz) 85.4 kg (188 lb 3.2 oz)       GEN: well nourished, in  no acute distress.  HEENT:  Pupils equal, round. Sclerae nonicteric.   NECK: Supple, no masses appreciated. No JVD.  C/V:  Regular rate and rhythm, no murmur, rub or gallop.    RESP: Respirations are unlabored. Clear to auscultation bilaterally without wheezing, rales, or rhonchi.  GI: Abdomen soft, nontender.  EXTREM: no LE edema.  NEURO: Alert and oriented, cooperative.  SKIN: Warm and dry.     Medications    heparin 850 Units/hr (11/24/23 0819)    - MEDICATION INSTRUCTIONS -      - MEDICATION INSTRUCTIONS -      sodium chloride        amLODIPine  5 mg Oral Daily    aspirin  325 mg Oral Once    Or    aspirin  243 mg Oral Once    aspirin  81 mg Oral Daily    bicalutamide  50 mg Oral Daily    carvedilol  12.5 mg Oral BID w/meals    cefTRIAXone  1 g Intravenous Q24H    clopidogrel  75 mg Oral Daily    vitamin B-12  250 mcg Oral QAM    insulin aspart  1-7 Units Subcutaneous TID AC    insulin aspart  1-5 Units Subcutaneous At Bedtime    levothyroxine  175 mcg Oral Daily    sodium chloride (PF)  3 mL Intracatheter Q8H    sodium chloride (PF)  3 mL Intracatheter Q8H    sodium chloride (PF)  3 mL Intracatheter Q8H    vitamin B complex with vitamin C  1 tablet Oral Daily       Data   Last 24 hours labs reviewed     Tele: SR 60s

## 2023-11-24 NOTE — PROVIDER NOTIFICATION
MD Notification    Notified Person: MD    Notified Person Name: Elaine ALMENDAREZ    Notification Date/Time: 11/24/23 @ 1121    Notification Interaction: kiesha    Purpose of Notification:  FYI: patient refuses IV Rocephin, refuses BG monitor done with hospital glucometer. He uses his own CGM. Night nurse placed casper catheter due to retention, need order please. Thank you         Orders Received: yes  Casper catheter order placed    Comments:

## 2023-11-24 NOTE — PROVIDER NOTIFICATION
MD Notification    Notified Person: MD    Notified Person Name:    Notification Date/Time:11/24/23 @ 1723    Notification Interaction:    Purpose of Notification:  Patient complaining of severe muscle spasms since he arrived from cath lab. Tylenol given. Angio site with scant blood. CMS intact  Orders Received:    Comments:  Hospitalist ordered flexeril 5 mg one time dose.    Dr An assessed patient in person, ordered another dose of flexeril. One time dose valium 2.5 mg IV. Magnesium 1 g IV

## 2023-11-24 NOTE — PROGRESS NOTES
[facility]  Hospitalist Progress Note        Shmuel Henry MD   11/24/2023        Interval History:        - Evaluated by cardiology, loaded with aspirin/Plavix, plan for cardiac cath 11/24, to continue heparin drip; also added amlodipine 5 mg BID, continue Coreg 12.5 mg BID  - evaluated by urology, casper catheter placed (11/23) for urinary decompression with note of bladder distension on CT abd  - Urology suggest to continue casper X 1 week; to follow up with Urology outpatient for TOV   - Patient is refusing to take PTA Keflex stating that it caused penile redness (he stopped after 3 doses); Urine culture from 11/21 noted with aerococcus urinae, no susceptibilities available  - d/w Pharmacy, will start amoxicillin 500 mg TID X 4 days to complete a 5 days course  - Cr 1.5---1.6         Assessment and Plan:        Mr. Panchito Olivera is an 87 yo male with history including DM2; HTN; CAD with prior stent; PAD; and metastatic prostate cancer on treatment; who presented with chest pain. Admitted inpatient 11/23/23.     On initial evaluation, patient was hypertensive 200/93.  ECG showed sinus rhythm without acute ischemic changes.  Labs notable for BMP with BUN 23.8 creatinine 1.54; CBC with platelet count 116 K otherwise normal; troponin 44 initially his follow-up 42; D-dimer 0.93.     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     Chest pain with trop elevation, concern for unstable angina/NSTEMI.  CAD with prior RCA stent 2021.  Hypertensive urgency.  * Initial presentation as above; serial troponins 44--- 42-- 49, no significant trend; currently chest pain free  - Evaluated by cardiology, loaded with aspirin/Plavix,  plan for cardiac cath 11/24, to continue heparin drip; also added amlodipine 5 mg BID, continue Coreg 12.5 mg BID  - Hold PTA HCTZ for now given contrast CT and need for LHC.  - PRN IV hydralazine   - awaiting ECHO    Worsened bilateral hydronephrosis.  * Initial presentation and CT chest imaging as above with some concern for worsening b/l hydronephrosis  - CT abdomen/plevis 11/23, however, noted no hydronephrosis but did note distended and mildly trabeculated urinary bladder  - evaluated by urology, casper catheter placed (11/23) for urinary decompression with note of bladder distension on CT abd  - Urology suggest to continue casper X 1 week; to follow up with Urology outpatient for TOV     Metastatic prostate cancer.  * Diagnosed 2000 s/p TURP x2. 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 On bicalutamide (started 10/2023) and leuprolide. Follows with [facility].  - Workup bilateral hydro as above.  - Otherwise, follow-up with [facility].    Recent UTI.  * Prescribed cephalexin for 7d for UTI 11/21 (thru 11/26) at Urgent Care  - Patient is refusing to take PTA Keflex stating that it caused penile redness (he stopped after 3 doses); Urine culture from 11/21 (care everywhere) noted with aerococcus urinae, no susceptibilities available  - d/w Pharmacy, will start amoxicillin 500 mg TID X 4 days to complete a 5 days course     Thrombocytopenia, appears to be chronic, unclear etiology, possibly medication effect or chronic from other cause.  * Plts 116K on admit. Plts have been in 90K to low 100K range over the past few years.  - platelets 116---104; monitor CBC intermittently.     Nonocclusive linear filling defect within the left lower lobe segment pulmonary artery on CT.  * Initial presentation as above. 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  - No specific treatment for now.  - Continue to monitor clinically.    DM2.   * Hgb A1C 6.5 7/2023.  - Moderate CHO diet.  - Order mediuim ISS for now but patient prefers to monitor his own blood glucose.     PAD.  H/o TIA   * US 2021 showed distal left posterior tibial disease; no flow left dorsalis pedis.  - Continue ASA.     CKD.   * Baseline cr seems to be around mid 1 range.  * Cr 1.54 ---1.6  - Hold PTA HCTZ for now given contrast CT and possible need for LHC.  - Continue to monitor BMP.  - Avoid nephrotoxic medications.     Hypothyroidism.  - Continue levothyroxine.     DVT Prophylaxis.  - PCD's, ambulation.   - On heparin gtt.     CODE STATUS: FULL    Diet:   NPO for Medical/Clinical Reasons Except for: Meds  NPO for Medical/Clinical Reasons Except for: Meds      Disposition:   - likely 1-2 days pending cardiology workup/clearance    Clinically Significant Risk Factors Present on Admission                  # Drug Induced Platelet Defect: home medication list includes an antiplatelet medication       # Hypertension: Noted on problem list       # DMII: A1C = 6.7 % (Ref range: <5.7 %) within past 6 months   # Overweight: Estimated body mass index is 27 kg/m  as calculated from the following:    Height as of this encounter: 1.778 m (5' 10\").    Weight as of this encounter: 85.4 kg (188 lb 3.2 oz).                 Page Me (7 am to 6 pm)    Care plan discussed with patient and nursing; also discussed with pharmacy  Total time spent today 55 minutes  High Medical Complexity              Physical Exam:      Blood pressure 136/78, pulse 55, temperature 98.6  F (37  C), temperature source Oral, resp. rate 18, height 1.778 m (5' 10\"), weight 85.4 kg (188 lb 3.2 oz), SpO2 95%.  Vitals:    11/23/23 0735 11/23/23 1354 11/24/23 0650   Weight: 88.5 kg (195 lb) 87 kg (191 lb 12.8 oz) 85.4 kg (188 lb 3.2 oz)     Vital Signs with Ranges  Temp:  [97.1  F (36.2  C)-98.6  F (37 "  C)] 98.6  F (37  C)  Pulse:  [55-78] 55  Resp:  [13-25] 18  BP: (105-210)/() 136/78  SpO2:  [94 %-96 %] 95 %  I/O's Last 24 hours  I/O last 3 completed shifts:  In: -   Out: 1770 [Urine:1770]    Constitutional: Alert, awake and oriented X 3; resting comfortably in no apparent distress       Oral cavity: Moist mucosa   Cardiovascular: Normal s1 s2, regular rate and rhythm, no murmur   Lungs: B/l clear to auscultation, no wheezes or crepitations   Abdomen: Soft, nt, nd, no guarding, rigidity or rebound; BS +   LE : No edema   Musculoskeletal/Neuro Power 5/5 in all extremities; No focal neurological deficits noted   Psychiatry: normal mood and affect  Araujo catheter in place with clear urine                Medications:         amLODIPine  5 mg Oral Daily    aspirin  81 mg Oral Daily    bicalutamide  50 mg Oral Daily    carvedilol  12.5 mg Oral BID w/meals    cefTRIAXone  1 g Intravenous Q24H    clopidogrel  75 mg Oral Daily    vitamin B-12  250 mcg Oral QAM    insulin aspart  1-7 Units Subcutaneous TID AC    insulin aspart  1-5 Units Subcutaneous At Bedtime    levothyroxine  175 mcg Oral Daily    sodium chloride (PF)  3 mL Intracatheter Q8H    sodium chloride (PF)  3 mL Intracatheter Q8H    vitamin B complex with vitamin C  1 tablet Oral Daily     PRN Meds: acetaminophen **OR** acetaminophen, alum & mag hydroxide-simethicone, calcium carbonate, glucose **OR** dextrose **OR** glucagon, hydrALAZINE, labetalol, lidocaine 4%, lidocaine 4%, lidocaine (buffered or not buffered), lidocaine (buffered or not buffered), - MEDICATION INSTRUCTIONS -, nitroGLYcerin, ondansetron **OR** ondansetron, polyethylene glycol, prochlorperazine **OR** prochlorperazine **OR** prochlorperazine, senna-docusate **OR** senna-docusate, sodium chloride (PF), sodium chloride (PF)         Data:      All new lab and imaging data was reviewed.   Recent Labs   Lab Test 11/24/23  0429 11/23/23  0826 05/09/18  0645   WBC 6.2 7.2 6.7   HGB 12.7*  "14.4 15.7   MCV 86 87 86   * 116* 105*      Recent Labs   Lab Test 11/24/23  0429 11/23/23  0826 09/24/21  1227 05/09/18  0953 05/09/18  0645    138  --   --  143   POTASSIUM 3.9 3.8  --   --  3.9   CHLORIDE 105 105  --   --  107   CO2 23 24  --   --  24   BUN 26.9* 23.8*  --   --  21   CR 1.66* 1.54* 1.7*  --  1.41*   ANIONGAP 10 9  --   --  12   MYRNA 9.3 9.6  --   --  9.0   * 141*  --  107 100     No lab results found.    Invalid input(s): \"TROP\", \"TROPONINIES\"      "

## 2023-11-24 NOTE — PROVIDER NOTIFICATION
MD Notification:     Notified Person: MD Hospitalist     Notified Person Name: Dr. Townsend     Notification Date/Time: 11/23/23 1842    Notification Interaction: AmCom text page     Purpose of Notification: Pt states that the PTA Keflex antibiotic is causing penile redness    Orders Received: Awaiting new orders

## 2023-11-24 NOTE — PLAN OF CARE
Goal Outcome Evaluation:    Plan Of Care Reviewed With Patient and Family     Pt A/Ox4, up with SBA. Heparin gtt continues 850 units/hr 10A will be rechecked at 0345. Family at bedside. Pt will remain NPO after midnight for an Angio tomorrow. Vitals stable and cardiac rhythm remains SR with first degree AVB. Pt declines chest pain.

## 2023-11-24 NOTE — PLAN OF CARE
Neuro: A&Ox4  Tele/Cardiac: SB 1 AVB  Resp: WDL  Activity: A1  Pain: denies  Drips/IV: heparin 850 unit/hr  GI/: Araujo placed overnight for retention/ urinary decompression.   Skin: WDL  Diet: Diet: NPO for Medical/Clinical Reasons Except for: Meds  NPO for Medical/Clinical Reasons Except for: Meds     Test/Procedures: possible angio, Echo  Plan: cardiology consult, Echo, possible angio

## 2023-11-24 NOTE — PROVIDER NOTIFICATION
MD Notification    Notified Person: MD Hospitalist     Notified Person Name: Dr. Townsend     Notification Date/Time: 11/23/23 9733    Notification Interaction: AmCom text page     Purpose of Notification: Pt declining PTA Keflex antibiotic, states that it is causing penile redness     Orders Received: Awaiting further orders

## 2023-11-25 ENCOUNTER — APPOINTMENT (OUTPATIENT)
Dept: PHYSICAL THERAPY | Facility: CLINIC | Age: 88
DRG: 322 | End: 2023-11-25
Attending: INTERNAL MEDICINE
Payer: MEDICARE

## 2023-11-25 VITALS
TEMPERATURE: 97.2 F | OXYGEN SATURATION: 95 % | WEIGHT: 189 LBS | BODY MASS INDEX: 27.06 KG/M2 | DIASTOLIC BLOOD PRESSURE: 62 MMHG | HEART RATE: 65 BPM | HEIGHT: 70 IN | RESPIRATION RATE: 18 BRPM | SYSTOLIC BLOOD PRESSURE: 121 MMHG

## 2023-11-25 LAB
ALBUMIN SERPL BCG-MCNC: 3.8 G/DL (ref 3.5–5.2)
ALP SERPL-CCNC: 52 U/L (ref 40–150)
ALT SERPL W P-5'-P-CCNC: 14 U/L (ref 0–70)
ANION GAP SERPL CALCULATED.3IONS-SCNC: 13 MMOL/L (ref 7–15)
AST SERPL W P-5'-P-CCNC: 31 U/L (ref 0–45)
BILIRUB DIRECT SERPL-MCNC: <0.2 MG/DL (ref 0–0.3)
BILIRUB SERPL-MCNC: 0.8 MG/DL
BUN SERPL-MCNC: 27.4 MG/DL (ref 8–23)
CALCIUM SERPL-MCNC: 9 MG/DL (ref 8.8–10.2)
CHLORIDE SERPL-SCNC: 104 MMOL/L (ref 98–107)
CREAT SERPL-MCNC: 1.53 MG/DL (ref 0.67–1.17)
DEPRECATED HCO3 PLAS-SCNC: 18 MMOL/L (ref 22–29)
EGFRCR SERPLBLD CKD-EPI 2021: 44 ML/MIN/1.73M2
GLUCOSE SERPL-MCNC: 118 MG/DL (ref 70–99)
POTASSIUM SERPL-SCNC: 4.3 MMOL/L (ref 3.4–5.3)
PROT SERPL-MCNC: 6 G/DL (ref 6.4–8.3)
SODIUM SERPL-SCNC: 135 MMOL/L (ref 135–145)

## 2023-11-25 PROCEDURE — 250N000013 HC RX MED GY IP 250 OP 250 PS 637: Performed by: INTERNAL MEDICINE

## 2023-11-25 PROCEDURE — 93005 ELECTROCARDIOGRAM TRACING: CPT

## 2023-11-25 PROCEDURE — 250N000011 HC RX IP 250 OP 636: Mod: JZ | Performed by: INTERNAL MEDICINE

## 2023-11-25 PROCEDURE — 99239 HOSP IP/OBS DSCHRG MGMT >30: CPT | Performed by: HOSPITALIST

## 2023-11-25 PROCEDURE — 97530 THERAPEUTIC ACTIVITIES: CPT | Mod: GP

## 2023-11-25 PROCEDURE — 250N000013 HC RX MED GY IP 250 OP 250 PS 637: Performed by: HOSPITALIST

## 2023-11-25 PROCEDURE — 97110 THERAPEUTIC EXERCISES: CPT | Mod: GP

## 2023-11-25 PROCEDURE — 82248 BILIRUBIN DIRECT: CPT | Performed by: HOSPITALIST

## 2023-11-25 PROCEDURE — 93010 ELECTROCARDIOGRAM REPORT: CPT | Performed by: INTERNAL MEDICINE

## 2023-11-25 PROCEDURE — 80053 COMPREHEN METABOLIC PANEL: CPT | Performed by: HOSPITALIST

## 2023-11-25 PROCEDURE — 36415 COLL VENOUS BLD VENIPUNCTURE: CPT | Performed by: HOSPITALIST

## 2023-11-25 PROCEDURE — 97161 PT EVAL LOW COMPLEX 20 MIN: CPT | Mod: GP

## 2023-11-25 PROCEDURE — 99232 SBSQ HOSP IP/OBS MODERATE 35: CPT | Performed by: INTERNAL MEDICINE

## 2023-11-25 RX ORDER — CLOPIDOGREL BISULFATE 75 MG/1
75 TABLET ORAL DAILY
Qty: 30 TABLET | Refills: 0 | Status: SHIPPED | OUTPATIENT
Start: 2023-11-26 | End: 2023-12-26

## 2023-11-25 RX ORDER — ROSUVASTATIN CALCIUM 20 MG/1
20 TABLET, COATED ORAL DAILY
Qty: 30 TABLET | Refills: 0 | Status: SHIPPED | OUTPATIENT
Start: 2023-11-25 | End: 2023-12-25

## 2023-11-25 RX ORDER — AMOXICILLIN 500 MG/1
500 CAPSULE ORAL EVERY 8 HOURS
Qty: 9 CAPSULE | Refills: 0 | Status: SHIPPED | OUTPATIENT
Start: 2023-11-25 | End: 2023-11-28

## 2023-11-25 RX ORDER — TIZANIDINE 2 MG/1
2 TABLET ORAL 3 TIMES DAILY PRN
Qty: 12 TABLET | Refills: 0 | Status: SHIPPED | OUTPATIENT
Start: 2023-11-25

## 2023-11-25 RX ORDER — AMLODIPINE BESYLATE 5 MG/1
5 TABLET ORAL 2 TIMES DAILY
Qty: 60 TABLET | Refills: 0 | Status: SHIPPED | OUTPATIENT
Start: 2023-11-25 | End: 2023-12-25

## 2023-11-25 RX ORDER — CYCLOBENZAPRINE HCL 5 MG
5 TABLET ORAL ONCE
Status: COMPLETED | OUTPATIENT
Start: 2023-11-25 | End: 2023-11-25

## 2023-11-25 RX ADMIN — CYCLOBENZAPRINE HYDROCHLORIDE 5 MG: 5 TABLET, FILM COATED ORAL at 10:16

## 2023-11-25 RX ADMIN — CARVEDILOL 12.5 MG: 12.5 TABLET, FILM COATED ORAL at 08:38

## 2023-11-25 RX ADMIN — AMOXICILLIN 500 MG: 500 CAPSULE ORAL at 06:01

## 2023-11-25 RX ADMIN — BICALUTAMIDE 50 MG: 50 TABLET, FILM COATED ORAL at 08:45

## 2023-11-25 RX ADMIN — B-COMPLEX W/ C & FOLIC ACID TAB 1 TABLET: TAB at 08:38

## 2023-11-25 RX ADMIN — AMOXICILLIN 500 MG: 500 CAPSULE ORAL at 13:00

## 2023-11-25 RX ADMIN — ASPIRIN 81 MG: 81 TABLET, COATED ORAL at 08:39

## 2023-11-25 RX ADMIN — CYANOCOBALAMIN TAB 500 MCG 250 MCG: 500 TAB at 08:46

## 2023-11-25 RX ADMIN — CLOPIDOGREL BISULFATE 75 MG: 75 TABLET ORAL at 08:38

## 2023-11-25 RX ADMIN — HYDRALAZINE HYDROCHLORIDE 10 MG: 20 INJECTION INTRAMUSCULAR; INTRAVENOUS at 06:12

## 2023-11-25 RX ADMIN — AMLODIPINE BESYLATE 5 MG: 5 TABLET ORAL at 08:38

## 2023-11-25 ASSESSMENT — ACTIVITIES OF DAILY LIVING (ADL)
ADLS_ACUITY_SCORE: 38
ADLS_ACUITY_SCORE: 38
ADLS_ACUITY_SCORE: 37
ADLS_ACUITY_SCORE: 37
ADLS_ACUITY_SCORE: 38

## 2023-11-25 NOTE — PLAN OF CARE
Goal Outcome Evaluation:  A&Ox4. VSS, on room air. Denies chest pain. No complains of gluteal/thigh spasms/cramps after medications given. Right femoral angio site is soft, non tender, scant blood. CMS intact. Tolerating diet.

## 2023-11-25 NOTE — PROGRESS NOTES
Patient seen and examined    - Stable creatinine around 1.5-- 1.6  - s/p cardiac cath 11/24 with note of single vessel occlusive CAD mid to distal LAD, patent previously placed RCA stents and mild to moderate disease noted elsewhere  - had PCI of mid to distal LAD with placement of a 2.5 x 34 mm resolute Candido stent  - had some severe leg spasm post angiogram, better with prn Flexeril  - aspirin and Plavix to be continued for one year  - patient does not want to be discharged on Araujo catheter; will discontinue 11/25 for a trial of void prior to discharge   - PT rec home with outpatient cardiac rehab    Discussed with cardiology, cleared for discharge home today.    Please see discharge summary for details     Care plan discussed with patient, nursing and cardiology.

## 2023-11-25 NOTE — DISCHARGE SUMMARY
[facility]  Discharge Summary        Panchito Olivera MRN# [id number]   YOB: 1935 Age: 88 year old     Date of Admission:  11/23/2023  Date of Discharge:  11/25/2023  Admitting Physician:  Panchito Christianson MD  Discharge Physician: Shmuel Henry MD  Discharging Service: Hospitalist     Primary Provider: Zeinab Dalal  Primary Care Physician Phone Number: None         Discharge Diagnoses/Problem Oriented Hospital Course (Providers):    Panchito Olivera was admitted on 11/23/2023 by Panchito Christianson MD and I would refer you to their history and physical.  The following problems were addressed during his hospitalization:    Mr. Panchito Olivera is an 87 yo male with history including DM2; HTN; CAD with prior stent; PAD; and metastatic prostate cancer on treatment; who presented with chest pain. Admitted inpatient 11/23/23.     On initial evaluation, patient was hypertensive 200/93.  ECG showed sinus rhythm without acute ischemic changes.  Labs notable for BMP with BUN 23.8 creatinine 1.54; CBC with platelet count 116 K otherwise normal; troponin 44 initially his follow-up 42; D-dimer 0.93.     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     Chest pain with trop elevation, concern for unstable angina/NSTEMI, s/p PCI with MARISSA of mid-distal LAD (11/24/23).  CAD with prior RCA stent 2021.  Hypertensive urgency.  * Initial presentation as above; serial troponins 44--- 42-- 49, no significant trend  - Evaluated by cardiology, loaded with aspirin/Plavix, was started on heaprin drip  - s/p cardiac cath 11/24 with note of single vessel occlusive CAD mid to distal LAD, patent previously placed RCA stents  and mild to moderate disease noted elsewhere  - had PCI of mid to distal LAD with placement of a 2.5 x 34 mm resolute [place] stent  - had some severe leg spasm post angiogram, better with prn Flexeril; ordered Zanaflex prn for discharge   - aspirin and Plavix to be continued for one year  - cards also added amlodipine 5 mg BID, continue Coreg 12.5 mg BID  - added Crestor 20 mg daily    Worsened bilateral hydronephrosis.  * Initial presentation and CT chest imaging as above with some concern for worsening b/l hydronephrosis  - CT abdomen/plevis 11/23, however, noted no hydronephrosis but did note distended and mildly trabeculated urinary bladder  - evaluated by urology, casper catheter placed (11/23) for urinary decompression with note of bladder distension on CT abd  - Urology suggest to continue casper X 1 week; to follow up with Urology outpatient for TOV   - however, patient does not want to be discharged on Casper catheter; will discontinue 11/25 for a trial of void prior to discharge      Metastatic prostate cancer.  * Diagnosed 2000 s/p TURP x2. 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 On bicalutamide (started 10/2023) and leuprolide. Follows with [facility].  - Workup bilateral hydro as above.  - Otherwise, follow-up with [facility].     Recent UTI.  * Prescribed cephalexin for 7d for UTI 11/21 (thru 11/26) at Urgent Care  - Patient is refusing to take PTA Keflex stating that it caused penile redness (he stopped after 3 doses); Urine culture from 11/21 (care everywhere) noted with aerococcus urinae, no susceptibilities available  - repeat UA from 11/23 unremarkable  - d/w Pharmacy, started amoxicillin 500 mg TID (11/24) X 4 days to complete a 5 days course     Thrombocytopenia, appears to be chronic, unclear etiology, possibly medication effect or chronic from other cause.  * Plts 116K on admit. Plts have been in 90K to low 100K range over the  "past few years.  - platelets 116---104; monitor CBC intermittently.     Nonocclusive linear filling defect within the left lower lobe segment pulmonary artery on CT.  * Initial presentation as above. 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  - No specific treatment for now.  - Continue to monitor clinically.     DM2.   * Hgb A1C 6.5 7/2023.  - Moderate CHO diet.     PAD.  H/o TIA   * US 2021 showed distal left posterior tibial disease; no flow left dorsalis pedis.  - Continue ASA; also added Plavix as above.     CKD.   * Baseline cr seems to be around mid 1 range.  * Cr 1.54 ---1.6--1.5; stable     Hypothyroidism.  - Continue levothyroxine.     CODE STATUS: FULL     Clinically Significant Risk Factors                  # Thrombocytopenia: Lowest platelets = 104 in last 2 days, will monitor for bleeding       # Hypertension: Noted on problem list         # DMII: A1C = 6.7 % (Ref range: <5.7 %) within past 6 months   # Overweight: Estimated body mass index is 27.12 kg/m  as calculated from the following:    Height as of this encounter: 1.778 m (5' 10\").    Weight as of this encounter: 85.7 kg (189 lb)., PRESENT ON ADMISSION                       Brief Hospital Stay Summary Sent Home With Patient in AVS:        Reason for your hospital stay      You were admitted for evaluation of heart attack. You had an angiogram   done and a stent was placed.                Pending Results:        Unresulted Labs Ordered in the Past 30 Days of this Admission       No orders found from 10/24/2023 to 11/24/2023.              Discharge Instructions and Follow-Up:      Follow-up Appointments     Follow-up and recommended labs and tests       Follow up with primary care provider, Zeinab Dalal, within 7 days for   hospital follow- up.  The following labs/tests are recommended: repeat CBC   and " BMP.      Follow up with cardiology in 1 to 2 weeks or as suggested.    Follow-up with outpatient cardiac rehab.                Discharge Disposition:      Discharged to home        Discharge Medications:        Current Discharge Medication List        START taking these medications    Details   amLODIPine (NORVASC) 5 MG tablet Take 1 tablet (5 mg) by mouth 2 times daily for 30 days  Qty: 60 tablet, Refills: 0    Comments: Future refills by PCP Dr. Zeinab Dalal with phone number None.  Associated Diagnoses: NSTEMI (non-ST elevated myocardial infarction) (H)      amoxicillin (AMOXIL) 500 MG capsule Take 1 capsule (500 mg) by mouth every 8 hours for 3 days  Qty: 9 capsule, Refills: 0    Associated Diagnoses: Urinary tract infection without hematuria, site unspecified      clopidogrel (PLAVIX) 75 MG tablet Take 1 tablet (75 mg) by mouth daily for 30 days  Qty: 30 tablet, Refills: 0    Comments: Future refills by PCP Dr. Zeinab Dalal with phone number None.  Associated Diagnoses: NSTEMI (non-ST elevated myocardial infarction) (H)      rosuvastatin (CRESTOR) 20 MG tablet Take 1 tablet (20 mg) by mouth daily for 30 days  Qty: 30 tablet, Refills: 0    Comments: Future refills by PCP Dr. Zeinab Dalal with phone number None.  Associated Diagnoses: NSTEMI (non-ST elevated myocardial infarction) (H)      tiZANidine (ZANAFLEX) 2 MG tablet Take 1 tablet (2 mg) by mouth 3 times daily as needed for muscle spasms  Qty: 12 tablet, Refills: 0    Associated Diagnoses: Muscle spasm           CONTINUE these medications which have NOT CHANGED    Details   aspirin 81 MG tablet Take 1 tablet by mouth daily.  Qty: 100 tablet, Refills: 3    Associated Diagnoses: Type 2 diabetes, HbA1C goal < 8% (H)      bicalutamide (CASODEX) 50 MG tablet Take 50 mg by mouth daily      CARVEDILOL PO Take 12.5 mg by mouth 2 times daily (with meals)      Cyanocobalamin (VITAMIN  B-12) 250 MCG TABS Take 250 mcg by mouth every morning      levothyroxine  "(SYNTHROID/LEVOTHROID) 175 MCG tablet Take 175 mcg by mouth daily      nitroglycerin (NITROSTAT) 0.4 MG SL tablet Place 1 tablet under the tongue every 5 minutes as needed for chest pain.  Qty: 25 tablet, Refills: 3    Associated Diagnoses: Angina pectoris (H24)      vitamin C with B complex (B COMPLEX-C) tablet Take 1 tablet by mouth daily      !! glucose blood VI test strips strip Check blood sugar twice daily  Qty: 3 Box, Refills: 5    Associated Diagnoses: Type 2 diabetes, HbA1C goal < 8% (H)      !! glucose blood VI test strips strip by In Vitro route daily.  Qty: 3 Box, Refills: 12    Associated Diagnoses: Type 2 diabetes, HbA1C goal < 8% (H)      insulin aspart protamine-insulin aspart (NOVOLOG MIX 70/30 FLEXPEN) SUSP 100 UNIT/ML Inject 5 -7 units under the skin twice daily before meals.  Qty: 3 Month, Refills: 3    Comments: Profile until pt requests  Associated Diagnoses: Type 2 diabetes, HbA1C goal < 8% (H)      insulin pen needle (BD ULTRA-FINE PEN NEEDLES) 29G X 12.7MM MISC Use 2 daily or as directed.  Qty: 100 each, Refills: 5    Associated Diagnoses: Type 2 diabetes, HbA1C goal < 8% (H)       !! - Potential duplicate medications found. Please discuss with provider.        STOP taking these medications       cephALEXin (KEFLEX) 500 MG capsule Comments:   Reason for Stopping:                 Allergies:         Allergies   Allergen Reactions    No Known Drug Allergy            Consultations This Hospital Stay:      Consultation during this admission received from cardiology and urology        Condition and Physical on Discharge:        Discharge condition: Stable   Vitals: Blood pressure 121/62, pulse 65, temperature (P) 98.1  F (36.7  C), temperature source (P) Oral, resp. rate 18, height 1.778 m (5' 10\"), weight 85.7 kg (189 lb), SpO2 95%.     Constitutional: Alert, awake and oriented X 3; resting comfortably in no apparent distress         Oral cavity: Moist mucosa   Cardiovascular: Normal s1 s2, " regular rate and rhythm, no murmur   Lungs: B/l clear to auscultation, no wheezes or crepitations   Abdomen: Soft, nt, nd, no guarding, rigidity or rebound; BS +   LE : No edema   Musculoskeletal/Neuro Power 5/5 in all extremities; No focal neurological deficits noted   Psychiatry: normal mood and affect  Araujo catheter in place with clear urine  Right groin with no significant hematoma or tenderness             Discharge Time:      Greater than 30 minutes.        Image Results From This Hospital Stay (For Non-EPIC Providers):        Results for orders placed or performed during the hospital encounter of 11/23/23   CT Chest Pulmonary Embolism w Contrast    Narrative    EXAM: CT CHEST PULMONARY EMBOLISM W CONTRAST  LOCATION: [facility]  DATE: 11/23/2023    INDICATION: Chest pain, elevated D dimer, concern for PE vs ACS  COMPARISON: CT 09/24/2021.  TECHNIQUE: CT chest pulmonary angiogram during arterial phase injection of IV contrast. Multiplanar reformats and MIP reconstructions were performed. Dose reduction techniques were used.   CONTRAST: 71mL ISOVUE 370    FINDINGS:  ANGIOGRAM CHEST: No evidence of acute pulmonary embolic disease. 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 The   thoracic aorta is not opacified with contrast and cannot be evaluated for dissection. There is a moderate burden of calcification of the thoracic aorta without aneurysmal dilatation.     LUNGS AND PLEURA: Stable right middle lobe nodule measuring 7 mm (series 7, image 157). Stable 4 mm subpleural nodule of the left upper lobe (series 7, image 89). Scattered bilateral subsegmental atelectasis. No pleural effusion.    MEDIASTINUM/AXILLAE: Small hiatal hernia. Normal heart size without pericardial effusion. No thoracic adenopathy.    CORONARY ARTERY CALCIFICATION: Previous  intervention (stents or CABG).    UPPER ABDOMEN: Cholecystectomy. Left renal cyst. Mild bilateral hydronephrosis which appears increased compared to prior.    MUSCULOSKELETAL: No acute osseous findings.      Impression    IMPRESSION:  1.  No evidence of acute pulmonary thromboembolic disease. 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  2.  Mild bilateral hydronephrosis appears increased. This could be further evaluated with CTA of the abdomen and pelvis if clinically indicated.  3.  Additional unchanged findings as above.   CT Abdomen Pelvis w/o Contrast    Narrative    EXAM: CT ABDOMEN PELVIS W/O CONTRAST  LOCATION: [facility]  DATE: 11/23/2023    INDICATION: Bilateral hydronephrosis. Urinary retention. Evaluate for signs of obstruction.  COMPARISON: CT abdomen pelvis 09/24/2021. CT chest 11/23/2023.  TECHNIQUE: CT scan of the abdomen and pelvis was performed without IV contrast. Multiplanar reformats were obtained. Dose reduction techniques were used.  CONTRAST: None.    FINDINGS:     LOWER CHEST: A 7 mm nodule within the right middle lobe is unchanged and likely benign. Small hiatal hernia. Coronary arterial calcifications and stents.    HEPATOBILIARY: Status post cholecystectomy. No biliary ductal dilation.    PANCREAS: Normal.    SPLEEN: Normal.    ADRENAL GLANDS: Benign lipid rich left adrenal adenoma is unchanged. Normal right adrenal gland.    KIDNEYS/BLADDER: Bilateral benign renal cysts measuring up to 7.8 cm at the lower pole. Chronic right lower pole cortical thinning. No hydronephrosis. Excreted contrast is present within both collecting systems, ureters, and the urinary bladder.   Distended and mildly trabeculated urinary bladder.    BOWEL: No bowel obstruction or inflammation. Normal appendix. Scattered noninflamed descending and sigmoid colonic  diverticuli.    LYMPH NODES: No enlarged lymph nodes.    VASCULATURE: Mild aortobiiliac atherosclerosis. No abdominal aortic aneurysm.    PELVIC ORGANS: Posttreatment changes of the prostate. Indwelling penile prosthesis with attached reservoir within the left lower quadrant.    MUSCULOSKELETAL: Postsurgical changes within both inguinal regions. Moderate left and mild right hip osteoarthrosis. Multilevel degenerative changes of the spine. Sclerotic lesion within the left medial ileum is unchanged. No acute bony abnormality or   destructive bone lesions.      Impression    IMPRESSION:     1.  No acute abnormality, within the limitations of the noncontrast technique.    2.  No hydronephrosis.    3.  Distended and mildly trabeculated urinary bladder.    4.  Additional nonemergent findings as described in the report body.   Cardiac Catheterization    Narrative      Mid Cx lesion is 40% stenosed.    1st Mrg lesion is 50% stenosed.    Mid LAD lesion is 20% stenosed.    Mid LAD to Dist LAD lesion is 75% stenosed.      Single-vessel occlusive coronary artery disease namely mid to distal LAD  Patent previously placed RCA stents  Mild to moderate disease noted elsewhere  Successful PCI of mid to distal LAD with placement of a 2.5 x 34 mm   resolute [place] stent             Most Recent Lab Results In EPIC (For Non-EPIC Providers):    Most Recent 3 CBC's:  Recent Labs   Lab Test 11/24/23  0429 11/23/23  0826 05/09/18  0645   WBC 6.2 7.2 6.7   HGB 12.7* 14.4 15.7   MCV 86 87 86   * 116* 105*      Most Recent 3 BMP's:  Recent Labs   Lab Test 11/25/23  0648 11/24/23  2116 11/24/23  1902 11/24/23  0429 11/23/23  0826     --   --  138 138   POTASSIUM 4.3  --   --  3.9 3.8   CHLORIDE 104  --   --  105 105   CO2 18*  --   --  23 24   BUN 27.4*  --   --  26.9* 23.8*   CR 1.53*  --   --  1.66* 1.54*   ANIONGAP 13  --   --  10 9   MYRNA 9.0  --   --  9.3 9.6   * 227* 122* 102* 141*     Most Recent 3 Troponin's:No lab  "results found.    Invalid input(s): \"TROP\", \"TROPONINIES\"  Most Recent 3 INR's:  Recent Labs   Lab Test 11/24/23  1514   INR 1.05     Most Recent 2 LFT's:  Recent Labs   Lab Test 11/25/23  0648   AST 31   ALT 14   ALKPHOS 52   BILITOTAL 0.8     Most Recent Cholesterol Panel:No lab results found.  Most Recent 6 Bacteria Isolates From Any Culture (See EPIC Reports for Culture Details):No lab results found.  Most Recent TSH, T4 and HgbA1c:   Recent Labs   Lab Test 11/23/23  0826   TSH 2.29   A1C 6.7*                 "

## 2023-11-25 NOTE — PLAN OF CARE
Care plan note: Pt improving since having PCI to LAD 11/24 via       Recent Vitals:  Temp: (P) 98.1  F (36.7  C) Temp src: (P) Oral BP: (!) 183/90 Pulse: 64   Resp: 18 SpO2: 96 % O2 Device: Nasal cannula      Orientation/Neuro: Alert and Oriented x 4, Forgetful, Impulsive, Easily re-directed  Pain: The patient is not having any pain.   Tele: Sinus rhythm  and 1 degree AV block   IV medications: PRN Hydralazine given for HTN x1   Mobility: Assist of 1, Gait belt, and Cane   Skin: Right Groin site: w/ sm amount leakage, no change overnight   GI: no complaints  : Araujo Catheter with reddish urine, MD notified, continue to monitor     Diet: Tolerating diet:   Well, has been refusing BG checks (has a CGM)  Orders Placed This Encounter      Regular Diet Adult      Safety/Concerns:  Fall Risk and Bleeding risk  Aggression Color: Green    Plan: Discharge pending cardiac rehab and safe disposition.   Continue to monitor.      Renee Au RN

## 2023-11-25 NOTE — PROGRESS NOTES
[facility]    Cardiology Consultation - Progress Note     Date of Admission:  11/23/2023  Date of Service: 11/25/23    Reason for Consult   Reason for consult: angina, now s/p PCI    History of Present Illness   Panchito Olivera is a 88 year old male who was admitted on 11/23/2023 for escalating angina in the context of hypertensive urgency.    On admission, troponin trend was 44 -> 42 -> 49. He was started on heparin infusion, and ASA/Plavix. Coronary angiogram yesterday showed 75% mid-distal LAD lesion and he underwent successful PCI with a 2.5 x 24 mm resolute [place] stent. He has moderate residual disease in the mid circumflex and first OM.     Overnight he had some blood from his Araujo catheter (placed for urinary retention), potentially from traumatic Araujo insertion. He tells me this is common when he straight caths at home.     He has also been quite hypertensive to the 170-180s systolic requiring 10 mg hydralazine this morning. He has been started on amlodipine 5 mg BID (first dose 11/23 PM). Given resting heart rate 50-60 bpm, we are likely unable to tolerate carvedilol further. Could consider long acting nitrates.    Assessment & Plan   # Unstable angina s/p PCI with MARISSA x1 to mid-distal LAD, 11/24/2023  # History of CAD with prior MARISSA to RCA (2021)  # Type 2 diabetes  # Hypertension  # Peripheral arterial disease  # Chronic kidney disease (baseline creatinine 1.5-1.6)  # Prostate cancer, metastatic, currently on bicalutamide and leuprolide  # Hematuria    Panchito Olivera is an 88-year-old gentleman hospitalized for unstable angina, now s/p PCI to the mid-distal LAD.    He is doing well this morning. Right femoral access site appears clean dry, intact without swelling. We discussed adherence to DAPT for one year, as well as risk factor modification. If no chest pain when ambulating, reasonable to discharge home today.    Plan:  Continue aspirin 81 mg daily + Plavix 75 mg daily  for 1 year from stent placement, then aspirin 81 mg daily lifelong.  Continue home carvedilol 12.5 mg BID.  Recommend starting statin therapy if patient amenable - has declined previously.  BP management per hospital team.   Ok to discharge today.  Outpatient follow-up 1 month.    Jena Escobar MD    Primary Care Physician   Zeinab Dalal    Patient Active Problem List   Diagnosis    Impotence of organic origin    HYPERLIPIDEMIA LDL GOAL <100    Hypothyroidism    Arthritis of right big toe    GERD (gastroesophageal reflux disease)    Thrombocytopenia (H24)    Hypertension goal BP (blood pressure) < 140/90    Type 2 diabetes, HbA1C goal < 8% (H)    CKD (chronic kidney disease) stage 3, GFR 30-59 ml/min (H)    Colon polyps    Hip flexor tendonitis    Advanced directives, counseling/discussion    Prostate cancer (H)    Unstable angina (H)    Bilateral hydronephrosis    Hypertensive urgency    Non compliance w medication regimen    Chest pain, unspecified type       Past Medical History   I have reviewed this patient's medical history and updated it with pertinent information if needed.   Past Medical History:   Diagnosis Date    Hypertension     Type II or unspecified type diabetes mellitus without mention of complication, not stated as uncontrolled     Unspecified disorder of kidney and ureter     Unspecified hypothyroidism        Past Surgical History   I have reviewed this patient's surgical history and updated it with pertinent information if needed.  Past Surgical History:   Procedure Laterality Date    CHOLECYSTECTOMY      COLONOSCOPY  5/7/2012    Procedure:COLONOSCOPY; colonoscopy; Surgeon:ROBE AVILEZ; Location: GI    EYE SURGERY      Cataract    HERNIA REPAIR      bilat ingunal    HERNIA REPAIR      IMPLANT PROSTHESIS PENIS INFLATABLE N/A 5/9/2018    Procedure: 3 PIECE PENILE PROSTHESIS;  Surgeon: Nigel Dia MD;  Location: [facility] [place];  Service:     TRANSRECTAL ULTRASONIC,  TRANSURETHRAL RESECTION (TUR) OF PROSTATE CYST      TURP  mandi 15,2013    TURP  1/2013    VARICOCELECTOMY BILATERAL Bilateral 1/24/2017    Procedure: VARICOCELECTOMY BILATERAL;  Surgeon: Diego Randle MD;  Location:  OR       Prior to Admission Medications   Prior to Admission Medications   Prescriptions Last Dose Informant Patient Reported? Taking?   CARVEDILOL PO 11/23/2023 Self Yes Yes   Sig: Take 12.5 mg by mouth 2 times daily (with meals)   Cyanocobalamin (VITAMIN  B-12) 250 MCG TABS 11/23/2023 Self Yes Yes   Sig: Take 250 mcg by mouth every morning   aspirin 81 MG tablet 11/22/2023 at HS Self No Yes   Sig: Take 1 tablet by mouth daily.   bicalutamide (CASODEX) 50 MG tablet 11/23/2023 Self Yes Yes   Sig: Take 50 mg by mouth daily   cephALEXin (KEFLEX) 500 MG capsule 11/23/2023 at AM Self Yes Yes   Sig: Take 500 mg by mouth 3 times daily   glucose blood VI test strips strip  Self No No   Sig: by In Vitro route daily.   glucose blood VI test strips strip  Self No No   Sig: Check blood sugar twice daily   insulin aspart protamine-insulin aspart (NOVOLOG MIX 70/30 FLEXPEN) SUSP 100 UNIT/ML  Self No No   Sig: Inject 5 -7 units under the skin twice daily before meals.   Patient taking differently: Inject 4-14 Units Subcutaneous 2 times daily (with meals)   insulin pen needle (BD ULTRA-FINE PEN NEEDLES) 29G X 12.7MM MISC  Self No No   Sig: Use 2 daily or as directed.   levothyroxine (SYNTHROID/LEVOTHROID) 175 MCG tablet 11/23/2023 Self Yes Yes   Sig: Take 175 mcg by mouth daily   nitroglycerin (NITROSTAT) 0.4 MG SL tablet Unknown Self No Yes   Sig: Place 1 tablet under the tongue every 5 minutes as needed for chest pain.   vitamin C with B complex (B COMPLEX-C) tablet 11/23/2023  Yes Yes   Sig: Take 1 tablet by mouth daily      Facility-Administered Medications: None     Current Facility-Administered Medications   Medication Dose Route Frequency    amLODIPine  5 mg Oral BID    amoxicillin  500 mg Oral Q8H LEIGHTON  "   aspirin  81 mg Oral Daily    bicalutamide  50 mg Oral Daily    carvedilol  12.5 mg Oral BID w/meals    clopidogrel  75 mg Oral Daily    vitamin B-12  250 mcg Oral QAM    insulin aspart  1-7 Units Subcutaneous TID AC    insulin aspart  1-5 Units Subcutaneous At Bedtime    levothyroxine  175 mcg Oral Daily    sodium chloride (PF)  3 mL Intracatheter Q8H    sodium chloride (PF)  3 mL Intracatheter Q8H    vitamin B complex with vitamin C  1 tablet Oral Daily     Current Facility-Administered Medications   Medication Last Rate    - MEDICATION INSTRUCTIONS -      - MEDICATION INSTRUCTIONS -      - MEDICATION INSTRUCTIONS -      Percutaneous Coronary Intervention orders placed (this is information for BPA alerting)       Allergies   Allergies   Allergen Reactions    No Known Drug Allergy        Social History    reports that he has never smoked. He has never used smokeless tobacco. He reports current alcohol use of about 4.0 standard drinks of alcohol per week. He reports that he does not use drugs.    Family History   No family history on file.    Review of Systems   The comprehensive Review of Systems is negative other than noted in the HPI or here.     Physical Exam   Vital Signs with Ranges  Temp:  [97.2  F (36.2  C)-98.1  F (36.7  C)] (P) 98.1  F (36.7  C)  Pulse:  [57-64] 57  Resp:  [17-18] 18  BP: (126-183)/(63-91) 183/90  SpO2:  [95 %-99 %] 95 %  Wt Readings from Last 4 Encounters:   11/25/23 85.7 kg (189 lb)   05/08/18 90.7 kg (200 lb)   02/09/17 90.7 kg (200 lb)   01/24/17 90.7 kg (200 lb)     I/O last 3 completed shifts:  In: 240 [P.O.:240]  Out: 2050 [Urine:2050]      Vitals: BP (!) 183/90   Pulse 57   Temp (P) 98.1  F (36.7  C) (Oral)   Resp 18   Ht 1.778 m (5' 10\")   Wt 85.7 kg (189 lb)   SpO2 95%   BMI 27.12 kg/m      General: Alert, oriented, in no acute distress  HEENT: Mucous membranes moist  Neck: Normal JVP  Skin: Femoral access site clean/dry/intact, no swelling.  CV: Regular rate and rhythm " "without murmur  Respiratory: Clear to auscultation bilaterally  GI: Normoactive bowel sounds; soft, non-tender abdomen  Neuro: No focal deficits appreciated  Extremities: No peripheral edema bilaterally    Recent Labs   Lab 11/24/23  2116 11/24/23  1902 11/24/23  1514 11/24/23  0429 11/23/23  0826   WBC  --   --   --  6.2 7.2   HGB  --   --   --  12.7* 14.4   MCV  --   --   --  86 87   PLT  --   --   --  104* 116*   INR  --   --  1.05  --   --    NA  --   --   --  138 138   POTASSIUM  --   --   --  3.9 3.8   CHLORIDE  --   --   --  105 105   CO2  --   --   --  23 24   BUN  --   --   --  26.9* 23.8*   CR  --   --   --  1.66* 1.54*   GFRESTIMATED  --   --   --  40* 43*   ANIONGAP  --   --   --  10 9   MYRNA  --   --   --  9.3 9.6   * 122*  --  102* 141*     No results for input(s): \"CHOL\", \"HDL\", \"LDL\", \"TRIG\", \"CHOLHDLRATIO\" in the last 78966 hours.  Recent Labs   Lab 11/24/23 0429 11/23/23  0826   WBC 6.2 7.2   HGB 12.7* 14.4   HCT 38.7* 43.8   MCV 86 87   * 116*     No results for input(s): \"PH\", \"PHV\", \"PO2\", \"PO2V\", \"SAT\", \"PCO2\", \"PCO2V\", \"HCO3\", \"HCO3V\" in the last 168 hours.  No results for input(s): \"NTBNPI\", \"NTBNP\" in the last 168 hours.  Recent Labs   Lab 11/23/23  0826   DD 0.93*     No results for input(s): \"SED\", \"CRP\" in the last 168 hours.  Recent Labs   Lab 11/24/23 0429 11/23/23  0826   * 116*     Recent Labs   Lab 11/23/23  0826   TSH 2.29     Recent Labs   Lab 11/23/23  1240   COLOR Light Yellow   APPEARANCE Clear   URINEGLC Negative   URINEBILI Negative   URINEKETONE Negative   SG 1.014   UBLD Negative   URINEPH 6.5   PROTEIN Negative   NITRITE Negative   LEUKEST Negative       Imaging:  Recent Results (from the past 48 hour(s))   CT Chest Pulmonary Embolism w Contrast    Narrative    EXAM: CT CHEST PULMONARY EMBOLISM W CONTRAST  LOCATION: [facility]  DATE: 11/23/2023    INDICATION: Chest pain, elevated D dimer, concern for PE vs ACS  COMPARISON: CT " 09/24/2021.  TECHNIQUE: CT chest pulmonary angiogram during arterial phase injection of IV contrast. Multiplanar reformats and MIP reconstructions were performed. Dose reduction techniques were used.   CONTRAST: 71mL ISOVUE 370    FINDINGS:  ANGIOGRAM CHEST: No evidence of acute pulmonary embolic disease. 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 The   thoracic aorta is not opacified with contrast and cannot be evaluated for dissection. There is a moderate burden of calcification of the thoracic aorta without aneurysmal dilatation.     LUNGS AND PLEURA: Stable right middle lobe nodule measuring 7 mm (series 7, image 157). Stable 4 mm subpleural nodule of the left upper lobe (series 7, image 89). Scattered bilateral subsegmental atelectasis. No pleural effusion.    MEDIASTINUM/AXILLAE: Small hiatal hernia. Normal heart size without pericardial effusion. No thoracic adenopathy.    CORONARY ARTERY CALCIFICATION: Previous intervention (stents or CABG).    UPPER ABDOMEN: Cholecystectomy. Left renal cyst. Mild bilateral hydronephrosis which appears increased compared to prior.    MUSCULOSKELETAL: No acute osseous findings.      Impression    IMPRESSION:  1.  No evidence of acute pulmonary thromboembolic disease. 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  2.  Mild bilateral hydronephrosis appears increased. This could be further evaluated with CTA of the abdomen and pelvis if clinically indicated.  3.  Additional unchanged findings as above.   CT Abdomen Pelvis w/o Contrast    Narrative    EXAM: CT ABDOMEN PELVIS W/O CONTRAST  LOCATION: [facility]  DATE: 11/23/2023    INDICATION: Bilateral hydronephrosis. Urinary retention.  Evaluate for signs of obstruction.  COMPARISON: CT abdomen pelvis 09/24/2021. CT chest 11/23/2023.  TECHNIQUE: CT scan of the abdomen and pelvis was performed without IV contrast. Multiplanar reformats were obtained. Dose reduction techniques were used.  CONTRAST: None.    FINDINGS:     LOWER CHEST: A 7 mm nodule within the right middle lobe is unchanged and likely benign. Small hiatal hernia. Coronary arterial calcifications and stents.    HEPATOBILIARY: Status post cholecystectomy. No biliary ductal dilation.    PANCREAS: Normal.    SPLEEN: Normal.    ADRENAL GLANDS: Benign lipid rich left adrenal adenoma is unchanged. Normal right adrenal gland.    KIDNEYS/BLADDER: Bilateral benign renal cysts measuring up to 7.8 cm at the lower pole. Chronic right lower pole cortical thinning. No hydronephrosis. Excreted contrast is present within both collecting systems, ureters, and the urinary bladder.   Distended and mildly trabeculated urinary bladder.    BOWEL: No bowel obstruction or inflammation. Normal appendix. Scattered noninflamed descending and sigmoid colonic diverticuli.    LYMPH NODES: No enlarged lymph nodes.    VASCULATURE: Mild aortobiiliac atherosclerosis. No abdominal aortic aneurysm.    PELVIC ORGANS: Posttreatment changes of the prostate. Indwelling penile prosthesis with attached reservoir within the left lower quadrant.    MUSCULOSKELETAL: Postsurgical changes within both inguinal regions. Moderate left and mild right hip osteoarthrosis. Multilevel degenerative changes of the spine. Sclerotic lesion within the left medial ileum is unchanged. No acute bony abnormality or   destructive bone lesions.      Impression    IMPRESSION:     1.  No acute abnormality, within the limitations of the noncontrast technique.    2.  No hydronephrosis.    3.  Distended and mildly trabeculated urinary bladder.    4.  Additional nonemergent findings as described in the report body.   Cardiac Catheterization    Narrative       Mid Cx lesion is 40% stenosed.    1st Mrg lesion is 50% stenosed.    Mid LAD lesion is 20% stenosed.    Mid LAD to Dist LAD lesion is 75% stenosed.      Single-vessel occlusive coronary artery disease namely mid to distal LAD  Patent previously placed RCA stents  Mild to moderate disease noted elsewhere  Successful PCI of mid to distal LAD with placement of a 2.5 x 34 mm   resolute [place] stent           30 MINUTES SPENT BY ME on the date of service doing chart review, history, exam, documentation & further activities per the note.

## 2023-11-25 NOTE — PROGRESS NOTES
11/25/23 0700   Appointment Info   Signing Clinician's Name / Credentials (PT) Bruna Vallejo, PT   Living Environment   People in Home spouse   Current Living Arrangements house   Home Accessibility stairs within home   Number of Stairs, Within Home, Primary seven   Stair Railings, Within Home, Primary railings safe and in good condition;railing on right side (ascending)   Transportation Anticipated family or friend will provide   Self-Care   Usual Activity Tolerance good   Current Activity Tolerance moderate   Regular Exercise Yes   Activity/Exercise Type walking   Exercise Amount/Frequency daily   Equipment Currently Used at Home cane, straight   Fall history within last six months no   General Information   Onset of Illness/Injury or Date of Surgery 11/23/23   Referring Physician Dr. Christianson   Patient/Family Therapy Goals Statement (PT) To go home   Pertinent History of Current Problem (include personal factors and/or comorbidities that impact the POC) Pt is an 88 year old male admitted with chest pain, now s/p PCI to LAD.   Existing Precautions/Restrictions cardiac   Cognition   Affect/Mental Status (Cognition) WFL   Orientation Status (Cognition) oriented x 3   Pain Assessment   Patient Currently in Pain Yes, see Vital Sign flowsheet  (In right foot. Pt attributes pain to right groin access from angio.)   Range of Motion (ROM)   Range of Motion ROM is WFL   Strength (Manual Muscle Testing)   Strength (Manual Muscle Testing) strength is WFL   Bed Mobility   Bed Mobility no deficits identified   Transfers   Transfers no deficits identified   Gait/Stairs (Locomotion)   Comment, (Gait/Stairs) 25' FWW SBA, forward bent posture.   Balance   Balance Comments Good   Clinical Impression   Criteria for Skilled Therapeutic Intervention Yes, treatment indicated   PT Diagnosis (PT) Impaired endurance   Influenced by the following impairments Decreased endurance   Functional limitations due to impairments Difficulty with long  distance ambulation, stairs   Clinical Presentation (PT Evaluation Complexity) stable   Clinical Presentation Rationale VSS, pain controlled   Clinical Decision Making (Complexity) low complexity   Planned Therapy Interventions (PT) gait training;patient/family education;progressive activity/exercise;risk factor education;home program guidelines   Risk & Benefits of therapy have been explained evaluation/treatment results reviewed;care plan/treatment goals reviewed;risks/benefits reviewed;current/potential barriers reviewed;participants voiced agreement with care plan;participants included;patient   PT Total Evaluation Time   PT Eval, Low Complexity Minutes (18691) 10   PT Discharge Planning   PT Plan Progress ambulation with FWW vs straight cane, stairs, review education   PT Discharge Recommendation (DC Rec) home with outpatient cardiac rehab   PT Rationale for DC Rec Pt modified independent with FWW.  Pt is normally modified independent with straight cane but is currently limited by pain in right LE, which he attributes to groin site for access to angio. Pt will benefit from outpatient CR for further cardiac education and endurance training.   PT Brief overview of current status Modified independent with FWW   PT Equipment Needed at Discharge walker, rolling

## 2023-11-25 NOTE — PROVIDER NOTIFICATION
MD Notification    Notified Person: MD    Notified Person Name: Dr. Mohsan Chaudhry    Notification Date/Time: 11/24/2023 8:01 PM.    Notification Interaction: [employer] messaging    Purpose of Notification: add IMC orders after angiogram w/ intervention    Orders Received: yes    Comments:

## 2023-11-25 NOTE — PROVIDER NOTIFICATION
MD Notification    Notified Person: MD    Notified Person Name: Dr. Enciso    Notification Date/Time: 11/24/2023 8:51 PM    Notification Interaction: [employer] messaging    Purpose of Notification: Urine appears bloody, some dried blood around meatus. Possible trauma from casper catheter tubing getting pulled or stepped on.    Orders Received: No new orders, continue to monitor.

## 2023-11-26 NOTE — PLAN OF CARE
Goal Outcome Evaluation:   Patient discharged home at about 1500. Patient insisted on removing casper catheter even though Urologist ordered patient to discharge with catheter. Patient said he has straight cath himself in the past. Patient requested several straight cath kits. Discharge instructions explained to patient and wife. Discharge medications explained.   Patient said he will cancel the appointment with [facility] clinic. Nurse strongly advised to participate on CORE clinic. VSS, on room air. Denies chest pain, no shortness of breath. Spasms/cramps controlled with flexeril. Right femoral angiogram site is soft, non tender, small amount of blood (unchanged)

## 2023-11-26 NOTE — PLAN OF CARE
Physical Therapy Discharge Summary    Reason for therapy discharge:    Discharged to home with outpatient therapy.    Progress towards therapy goal(s). See goals on Care Plan in [facility] electronic health record for goal details.  Goals not met.  Barriers to achieving goals:   discharge on same date as initial evaluation.    Therapy recommendation(s):    Continued therapy is recommended.  Rationale/Recommendations:  For further cardiac education and endurance training.

## 2023-11-28 LAB
ATRIAL RATE - MUSE: 60 BPM
DIASTOLIC BLOOD PRESSURE - MUSE: NORMAL MMHG
INTERPRETATION ECG - MUSE: NORMAL
P AXIS - MUSE: 83 DEGREES
PR INTERVAL - MUSE: 294 MS
QRS DURATION - MUSE: 82 MS
QT - MUSE: 456 MS
QTC - MUSE: 456 MS
R AXIS - MUSE: 93 DEGREES
SYSTOLIC BLOOD PRESSURE - MUSE: NORMAL MMHG
T AXIS - MUSE: 66 DEGREES
VENTRICULAR RATE- MUSE: 60 BPM

## 2023-11-29 ENCOUNTER — TELEPHONE (OUTPATIENT)
Dept: UROLOGY | Facility: CLINIC | Age: 88
End: 2023-11-29
Payer: COMMERCIAL

## 2023-11-29 ENCOUNTER — TELEPHONE (OUTPATIENT)
Dept: CARDIOLOGY | Facility: CLINIC | Age: 88
End: 2023-11-29
Payer: COMMERCIAL

## 2023-11-29 DIAGNOSIS — I25.10 CAD (CORONARY ARTERY DISEASE): Primary | ICD-10-CM

## 2023-11-29 NOTE — TELEPHONE ENCOUNTER
Patient was admitted to [facility] on 11/23/23 with recurrent chest pain and elevated troponin's.     PMH: known history of PCI to the RCA, DM2, uncontrolled HTN, PAD, CKD, Metastatic prostate cancer, dx 2000 s/p TURP x 2-now with metastatic disease, on Bicalutamide and Leuprolide, started 10/2023.    11/24/23: Coronary angiogram via RFA resulted in:    Single-vessel occlusive coronary artery disease namely mid to distal LAD  Patent previously placed RCA stents  Mild to moderate disease noted elsewhere  Successful PCI of mid to distal LAD with placement of a 2.5 x 34 mm resolute [place] stent    Pt was started on Norvasc, Plavix, Crestor. PTA NTG and ASA were continued at time of discharge.    Pt needs to be scheduled for a cardiology DELIO OV-order placed.    Cardiac rehab still needs to be scheduled.    Writer attempted to call pt for a cardiology post discharge phone call, but no answer. VM left to return my call. Reminder left of need to schedule follow up OV as above. Scheduling and writer's phone numbers were provided. SHAQ Cedeno RN.

## 2023-11-29 NOTE — TELEPHONE ENCOUNTER
----- Message from Endy Shannon MD sent at 11/25/2023  3:19 PM CST -----  Michael Florez please be scheduled with DELIO in 1-2 weeks for voiding trial, discussion of urinary retention and additional workup? Thanks!    Leonard

## 2023-11-30 LAB
ATRIAL RATE - MUSE: 57 BPM
ATRIAL RATE - MUSE: 61 BPM
DIASTOLIC BLOOD PRESSURE - MUSE: NORMAL MMHG
DIASTOLIC BLOOD PRESSURE - MUSE: NORMAL MMHG
INTERPRETATION ECG - MUSE: NORMAL
INTERPRETATION ECG - MUSE: NORMAL
P AXIS - MUSE: 25 DEGREES
P AXIS - MUSE: 80 DEGREES
PR INTERVAL - MUSE: 286 MS
PR INTERVAL - MUSE: 310 MS
QRS DURATION - MUSE: 74 MS
QRS DURATION - MUSE: 76 MS
QT - MUSE: 454 MS
QT - MUSE: 472 MS
QTC - MUSE: 457 MS
QTC - MUSE: 459 MS
R AXIS - MUSE: 56 DEGREES
R AXIS - MUSE: 97 DEGREES
SYSTOLIC BLOOD PRESSURE - MUSE: NORMAL MMHG
SYSTOLIC BLOOD PRESSURE - MUSE: NORMAL MMHG
T AXIS - MUSE: 65 DEGREES
T AXIS - MUSE: 70 DEGREES
VENTRICULAR RATE- MUSE: 57 BPM
VENTRICULAR RATE- MUSE: 61 BPM

## 2023-12-06 NOTE — TELEPHONE ENCOUNTER
"Second attempt at trying to contact pt.    Pt verbally upset and vocalize what he feels was deficiency in care during his hospitalization. \"I didn't even get to meet the surgeon before or after the procedure. They did not have a recovery room and I was screaming out in pain for over an hour and the nurse came in and gave me a Tylenol. My wife and son were with me and didn't know what to do. I was not given any input whether to go in my wrist or groin for the procedure. After the procedure, I was just dumped in a room like a pig. I will never go back to that hospital.\" 1:1 listening provided and apologies extended. Writer offered [facility] pt complaint phone number, but he declined. \"Nothing will change anyway. I never expected to be treated like this in a [facility].\" Again apologies extended.    Writer was able to ask pt about RFA access site, which he states is healing without swelling or bleeding.    Reminded pt of importance of taking Plavix daily without interruption. Pt states he has been taking all meds as prescribed.    States he will follow up with Dr. Velazquez, his PMD in [place], and will follow up with his former cardiology team in [place], \"In 6 months.\" Writer stressed importance of not running out of his medications and he verbalized understanding.    Pt apologetic with vocalizing his frustrations with writer. Denies any further questions. SHAQ Cedeno RN.  "

## 2024-09-24 ENCOUNTER — OFFICE VISIT (OUTPATIENT)
Dept: URGENT CARE | Facility: URGENT CARE | Age: 89
End: 2024-09-24
Payer: MEDICARE

## 2024-09-24 VITALS
HEART RATE: 70 BPM | DIASTOLIC BLOOD PRESSURE: 74 MMHG | SYSTOLIC BLOOD PRESSURE: 135 MMHG | RESPIRATION RATE: 16 BRPM | OXYGEN SATURATION: 97 %

## 2024-09-24 DIAGNOSIS — H61.22 IMPACTED CERUMEN OF LEFT EAR: Primary | ICD-10-CM

## 2024-09-24 DIAGNOSIS — H61.21 IMPACTED CERUMEN OF RIGHT EAR: ICD-10-CM

## 2024-09-24 PROCEDURE — 69209 REMOVE IMPACTED EAR WAX UNI: CPT | Mod: RT | Performed by: NURSE PRACTITIONER

## 2024-09-24 PROCEDURE — 69210 REMOVE IMPACTED EAR WAX UNI: CPT | Mod: LT | Performed by: NURSE PRACTITIONER

## 2024-09-24 NOTE — NURSING NOTE
Second attempt left ear wash completed with tap water only, cerumen was removed.    Adeline James MA  9/24/2024

## 2024-09-24 NOTE — NURSING NOTE
Bilateral ear wash per Provider with tap water only. Right ear cerumen was removed only.    Adeline James MA  9/24/2024

## 2024-09-24 NOTE — PROGRESS NOTES
"Chief Complaint   Patient presents with    Urgent Care     \"Waterfall sounds and humming\" in both ears. Both ears are blocked.         ICD-10-CM    1. Impacted cerumen of left ear  H61.22 SC REMOVAL IMPACTED CERUMEN IRRIGATION/LVG UNILAT     REMOVE IMPACTED CERUMEN      2. Impacted cerumen of right ear  H61.21 SC REMOVAL IMPACTED CERUMEN IRRIGATION/LVG UNILAT      Ears were irrigated with water bilaterally then curette was used by this provider to remove the remaining portion of wax from the left ear.  Patient tolerated procedure well and there were no complications.    Subjective     Panchito Olivera is an 88 year old male who presents to clinic today for sensation that both ears are plugged.  Symptoms started a while ago.  He has had previous history with cerumen buildup in his ears.  He denies any fever, chills or other upper respiratory symptoms.    Objective    /74   Pulse 70   Resp 16   SpO2 97%   Nurses notes and VS have been reviewed.    Physical Exam     Alert and oriented, bilateral ear canals are obstructed with cerumen, after irrigation and curette removal of wax tympanic membranes appear normal.      AISHA Chase, CNP  [facility] Provider    The use of [email or web address] dictation services may have been used to construct the content in this note; any grammatical or spelling errors are non-intentional. Please contact the author of this note directly if you are in need of any clarification.   "

## (undated) DEVICE — INFL DVC BASIXCOMPAK PLYCRB 30 ATM 13IN 20ML IN4530

## (undated) DEVICE — DEFIB PRO-PADZ LVP LQD GEL ADULT 8900-2105-01

## (undated) DEVICE — CATH BALLOON NC EMERGE 2.75X15MM H7493926715270

## (undated) DEVICE — CATH BALLOON EMERGE 2.25X20MMH7493918920220

## (undated) DEVICE — CATH GUIDING  6F MACH 1 GUIDING CLS3.5

## (undated) DEVICE — INTRO SHEATH 6FRX10CM PINNACLE RSS602

## (undated) DEVICE — GUIDEWIRE VASC 0.014INX180CM RUNTHROUGH 25-1011

## (undated) DEVICE — TOTE ANGIO CORP PC15AT SAN32CC83O

## (undated) DEVICE — CATH ANGIO JUDKINS JL4 6FRX100CM INFINITI 534620T

## (undated) DEVICE — CATH ANGIO INFINITI 3DRC 6FRX100CM 534676T

## (undated) DEVICE — VALVE HEMOSTASIS .096" COPILOT MECH 1003331

## (undated) DEVICE — KIT HAND CONTROL ANGIOTOUCH ACIST 65CM AT-P65

## (undated) DEVICE — MANIFOLD KIT ANGIO AUTOMATED 014613

## (undated) DEVICE — INTRODUCER CATH VASC 5FRX10CM  MPIS-501-NT-U-SST

## (undated) RX ORDER — HEPARIN SODIUM 1000 [USP'U]/ML
INJECTION, SOLUTION INTRAVENOUS; SUBCUTANEOUS
Status: DISPENSED
Start: 2023-11-24

## (undated) RX ORDER — FENTANYL CITRATE 50 UG/ML
INJECTION, SOLUTION INTRAMUSCULAR; INTRAVENOUS
Status: DISPENSED
Start: 2023-11-24

## (undated) RX ORDER — HEPARIN SODIUM 200 [USP'U]/100ML
INJECTION, SOLUTION INTRAVENOUS
Status: DISPENSED
Start: 2023-11-24

## (undated) RX ORDER — LIDOCAINE HYDROCHLORIDE 10 MG/ML
INJECTION, SOLUTION EPIDURAL; INFILTRATION; INTRACAUDAL; PERINEURAL
Status: DISPENSED
Start: 2023-11-24